# Patient Record
Sex: MALE | Race: WHITE | NOT HISPANIC OR LATINO
[De-identification: names, ages, dates, MRNs, and addresses within clinical notes are randomized per-mention and may not be internally consistent; named-entity substitution may affect disease eponyms.]

---

## 2017-01-10 ENCOUNTER — APPOINTMENT (OUTPATIENT)
Dept: CARDIOLOGY | Facility: CLINIC | Age: 72
End: 2017-01-10

## 2017-01-31 ENCOUNTER — APPOINTMENT (OUTPATIENT)
Dept: CARDIOLOGY | Facility: CLINIC | Age: 72
End: 2017-01-31

## 2017-02-13 ENCOUNTER — APPOINTMENT (OUTPATIENT)
Dept: ELECTROPHYSIOLOGY | Facility: CLINIC | Age: 72
End: 2017-02-13

## 2017-02-13 VITALS
DIASTOLIC BLOOD PRESSURE: 88 MMHG | HEART RATE: 152 BPM | WEIGHT: 210 LBS | HEIGHT: 72 IN | SYSTOLIC BLOOD PRESSURE: 120 MMHG | BODY MASS INDEX: 28.44 KG/M2

## 2017-02-13 DIAGNOSIS — Z78.9 OTHER SPECIFIED HEALTH STATUS: ICD-10-CM

## 2017-02-13 DIAGNOSIS — Z82.49 FAMILY HISTORY OF ISCHEMIC HEART DISEASE AND OTHER DISEASES OF THE CIRCULATORY SYSTEM: ICD-10-CM

## 2017-02-13 DIAGNOSIS — Z87.438 PERSONAL HISTORY OF OTHER DISEASES OF MALE GENITAL ORGANS: ICD-10-CM

## 2017-02-14 PROBLEM — Z82.49 FAMILY HISTORY OF CORONARY ARTERIOSCLEROSIS: Status: ACTIVE | Noted: 2017-02-14

## 2017-02-14 PROBLEM — Z78.9 SOCIAL ALCOHOL USE: Status: ACTIVE | Noted: 2017-02-14

## 2017-02-14 PROBLEM — Z87.438 HISTORY OF BPH: Status: ACTIVE | Noted: 2017-02-14

## 2017-02-14 RX ORDER — DUTASTERIDE 0.5 MG/1
0.5 CAPSULE, LIQUID FILLED ORAL
Refills: 0 | Status: ACTIVE | COMMUNITY

## 2017-02-14 RX ORDER — CLONAZEPAM 1 MG/1
1 TABLET ORAL
Refills: 0 | Status: ACTIVE | COMMUNITY

## 2017-02-14 RX ORDER — UBIDECARENONE 100 MG
100 CAPSULE ORAL
Refills: 0 | Status: ACTIVE | COMMUNITY

## 2017-02-14 RX ORDER — CHROMIUM 200 MCG
1000 TABLET ORAL
Refills: 0 | Status: ACTIVE | COMMUNITY

## 2017-02-15 NOTE — ASU PATIENT PROFILE, ADULT - PMH
BPH (benign prostatic hyperplasia)    GERD (gastroesophageal reflux disease)    Palpitations    SVT (supraventricular tachycardia)

## 2017-02-16 ENCOUNTER — OUTPATIENT (OUTPATIENT)
Dept: OUTPATIENT SERVICES | Facility: HOSPITAL | Age: 72
LOS: 1 days | Discharge: HOME | End: 2017-02-16
Payer: MEDICARE

## 2017-02-16 VITALS
RESPIRATION RATE: 20 BRPM | OXYGEN SATURATION: 100 % | SYSTOLIC BLOOD PRESSURE: 124 MMHG | TEMPERATURE: 98 F | HEART RATE: 62 BPM | HEIGHT: 73 IN | WEIGHT: 210.32 LBS | DIASTOLIC BLOOD PRESSURE: 92 MMHG

## 2017-02-16 DIAGNOSIS — Z90.89 ACQUIRED ABSENCE OF OTHER ORGANS: Chronic | ICD-10-CM

## 2017-02-16 DIAGNOSIS — Z98.890 OTHER SPECIFIED POSTPROCEDURAL STATES: Chronic | ICD-10-CM

## 2017-02-16 LAB
ANION GAP SERPL CALC-SCNC: 7 MMOL/L — SIGNIFICANT CHANGE UP (ref 5–17)
BUN SERPL-MCNC: 18 MG/DL — SIGNIFICANT CHANGE UP (ref 7–23)
CALCIUM SERPL-MCNC: 9.5 MG/DL — SIGNIFICANT CHANGE UP (ref 8.5–10.1)
CHLORIDE SERPL-SCNC: 106 MMOL/L — SIGNIFICANT CHANGE UP (ref 96–108)
CO2 SERPL-SCNC: 27 MMOL/L — SIGNIFICANT CHANGE UP (ref 22–31)
CREAT SERPL-MCNC: 1.18 MG/DL — SIGNIFICANT CHANGE UP (ref 0.5–1.3)
GLUCOSE SERPL-MCNC: 98 MG/DL — SIGNIFICANT CHANGE UP (ref 70–99)
HCT VFR BLD CALC: 48.5 % — SIGNIFICANT CHANGE UP (ref 39–50)
HGB BLD-MCNC: 16.7 G/DL — SIGNIFICANT CHANGE UP (ref 13–17)
MCHC RBC-ENTMCNC: 32 PG — SIGNIFICANT CHANGE UP (ref 27–34)
MCHC RBC-ENTMCNC: 34.5 GM/DL — SIGNIFICANT CHANGE UP (ref 32–36)
MCV RBC AUTO: 93 FL — SIGNIFICANT CHANGE UP (ref 80–100)
PLATELET # BLD AUTO: 225 K/UL — SIGNIFICANT CHANGE UP (ref 150–400)
POTASSIUM SERPL-MCNC: 4.6 MMOL/L — SIGNIFICANT CHANGE UP (ref 3.5–5.3)
POTASSIUM SERPL-SCNC: 4.6 MMOL/L — SIGNIFICANT CHANGE UP (ref 3.5–5.3)
RBC # BLD: 5.22 M/UL — SIGNIFICANT CHANGE UP (ref 4.2–5.8)
RBC # FLD: 11 % — SIGNIFICANT CHANGE UP (ref 10.3–14.5)
SODIUM SERPL-SCNC: 140 MMOL/L — SIGNIFICANT CHANGE UP (ref 135–145)
WBC # BLD: 7.8 K/UL — SIGNIFICANT CHANGE UP (ref 3.8–10.5)
WBC # FLD AUTO: 7.8 K/UL — SIGNIFICANT CHANGE UP (ref 3.8–10.5)

## 2017-02-16 RX ORDER — FINASTERIDE 5 MG/1
5 TABLET, FILM COATED ORAL DAILY
Qty: 0 | Refills: 0 | Status: DISCONTINUED | OUTPATIENT
Start: 2017-02-16 | End: 2017-02-17

## 2017-02-16 RX ORDER — VALSARTAN 80 MG/1
320 TABLET ORAL DAILY
Qty: 0 | Refills: 0 | Status: DISCONTINUED | OUTPATIENT
Start: 2017-02-16 | End: 2017-02-17

## 2017-02-16 RX ORDER — ISOPROTERENOL HYDROCHLORIDE 1 MG/5ML
0.07 INJECTION, SOLUTION INTRACARDIAC; INTRAMUSCULAR; INTRAVENOUS; SUBCUTANEOUS
Qty: 1 | Refills: 0 | Status: DISCONTINUED | OUTPATIENT
Start: 2017-02-16 | End: 2017-02-17

## 2017-02-16 RX ORDER — CLONAZEPAM 1 MG
1 TABLET ORAL DAILY
Qty: 0 | Refills: 0 | Status: DISCONTINUED | OUTPATIENT
Start: 2017-02-16 | End: 2017-02-17

## 2017-02-16 RX ORDER — AMLODIPINE BESYLATE 2.5 MG/1
5 TABLET ORAL DAILY
Qty: 0 | Refills: 0 | Status: DISCONTINUED | OUTPATIENT
Start: 2017-02-16 | End: 2017-02-17

## 2017-02-16 RX ORDER — CARVEDILOL PHOSPHATE 80 MG/1
3.12 CAPSULE, EXTENDED RELEASE ORAL EVERY 12 HOURS
Qty: 0 | Refills: 0 | Status: DISCONTINUED | OUTPATIENT
Start: 2017-02-16 | End: 2017-02-17

## 2017-02-16 RX ORDER — ASPIRIN/CALCIUM CARB/MAGNESIUM 324 MG
325 TABLET ORAL DAILY
Qty: 0 | Refills: 0 | Status: DISCONTINUED | OUTPATIENT
Start: 2017-02-16 | End: 2017-02-17

## 2017-02-16 RX ADMIN — FINASTERIDE 5 MILLIGRAM(S): 5 TABLET, FILM COATED ORAL at 22:28

## 2017-02-16 RX ADMIN — Medication 1 MILLIGRAM(S): at 22:28

## 2017-02-16 RX ADMIN — CARVEDILOL PHOSPHATE 3.12 MILLIGRAM(S): 80 CAPSULE, EXTENDED RELEASE ORAL at 20:16

## 2017-02-16 NOTE — CHART NOTE - NSCHARTNOTEFT_GEN_A_CORE
[place] [address]  Electrophysiology Lab    EP Study and SVT Ablation  Electrophysiologic Study with catheter Ablation    Patient Information    Patient Name			Roverto Marc  Study Date			2017  MRN				[id number]  [facility]				1945  Age				71yrs  Gender				Male  Referring			Karthikeyan Marino MD  Electrophysiologist		Donaldo Blanc MD    Procedure: Diagnostic EP Study and SVT Ablation  Indication: SVT  Anesthesia: As per anesthesiology note and 1% Lidocaine to each groin site  Methods:   The right and left groins were prepped with chorhexidine and then draped via sterile technique.  A 1% lidocaine solution was used to anesthetize each groin site.  Using the modified seldinger technique left and right femoral venous access was obtained and sheaths were placed (see below for further details).  Catheters were brought up to the heart and diagnostic EP testing was performed followed by SVT ablation (please see below for further detail).  At the completion of the procedure the catheters were removed (Figure of 8 – 0 Ethibond suture was applied to each groin site) the sheaths were removed and pressure was held and hemostasis obtained.  The patient was observed in the recovery santana and was noted to be in stable condition.        Equipment:            Sheaths  Left Femoral vein - 6 fr Sheath, 5 fr Sheath, 5 fr Sheath  Right Femoral vein - 8 fr Sheath (SR0 exchanged), 7 fr sheath  Catheters            Jsn X2, CRD-2, deflectable decapolar, BioSense 4mm Ablation Catheter  Mapping            CARTO 3 D Mapping   Findings:            Baseline Intervals               SCL –1021ms     AL –268ms     QRS -  71ms   QT- 386ms  AH-  161ms    HV- 65ms                         Ventricular Testing              VABCL- 380ms  	               -             Atrial Testing                AVNBCL- 500ms (post ablation – AVNRT always occurred pre-ablation)                              Notes                 - Dual AV nodes were demonstrated with echo beats seen with A1/A2.  AVNRT was nearly incessant and  all echo beats which were concentric.           Ablation:            A His cloud was created using the CARTO 3D mapping system.  The ablation catheter was positioned along the tricuspid annulus immediately anterior to the coronary sinus ostium.  The AV ratio recording from the distal electrode pair was about 1:3.  RF applications were delivered at about site 8 (on the 12 point scale) and numerous junctionals were elicited.  There were no dropped atrial beats noted during ablation and special care was taken to observe for them.    Post Ablation Testing:            Baseline intervals including the HV interval after the final RF application were measured and noted to be unchanged from prior to ablation.  Isuprel was used to re-assess for any inducible arrhythmias and there were none inducible.         Complications:            None      Impression:  •	     Inducible Typical AV Node Reentry.   •	     Successful slow pathway modification for treatment of this condition.     Recommendation(s):    •	Follow-up in 2-4 weeks as OP  •	Beta blocker can be discontinued  •	Observe on telemetry for 5 hours and then discharge in am after suture removal if stable             Donaldo Blanc MD, FHRS, FACC    of Cardiology [employer] [facility] [employer]  ABIM Certification in Cardiac EP / Cardiovascular Disease & Internal Medicine  [facility]

## 2017-02-16 NOTE — PACU DISCHARGE NOTE - COMMENTS
Pt placed on telemetry monitor; rhythm verified by Norah MARTINEZ; transported via stretcher with transporter

## 2017-02-17 ENCOUNTER — TRANSCRIPTION ENCOUNTER (OUTPATIENT)
Age: 72
End: 2017-02-17

## 2017-02-17 VITALS
TEMPERATURE: 98 F | RESPIRATION RATE: 17 BRPM | SYSTOLIC BLOOD PRESSURE: 111 MMHG | DIASTOLIC BLOOD PRESSURE: 61 MMHG | OXYGEN SATURATION: 92 % | HEART RATE: 62 BPM

## 2017-02-17 LAB
ANION GAP SERPL CALC-SCNC: 6 MMOL/L — SIGNIFICANT CHANGE UP (ref 5–17)
BASOPHILS # BLD AUTO: 0.1 K/UL — SIGNIFICANT CHANGE UP (ref 0–0.2)
BASOPHILS NFR BLD AUTO: 0.7 % — SIGNIFICANT CHANGE UP (ref 0–2)
BUN SERPL-MCNC: 23 MG/DL — SIGNIFICANT CHANGE UP (ref 7–23)
CALCIUM SERPL-MCNC: 9 MG/DL — SIGNIFICANT CHANGE UP (ref 8.5–10.1)
CHLORIDE SERPL-SCNC: 105 MMOL/L — SIGNIFICANT CHANGE UP (ref 96–108)
CO2 SERPL-SCNC: 30 MMOL/L — SIGNIFICANT CHANGE UP (ref 22–31)
CREAT SERPL-MCNC: 1.09 MG/DL — SIGNIFICANT CHANGE UP (ref 0.5–1.3)
EOSINOPHIL # BLD AUTO: 0.3 K/UL — SIGNIFICANT CHANGE UP (ref 0–0.5)
EOSINOPHIL NFR BLD AUTO: 4.3 % — SIGNIFICANT CHANGE UP (ref 0–6)
GLUCOSE SERPL-MCNC: 119 MG/DL — HIGH (ref 70–99)
HCT VFR BLD CALC: 44.4 % — SIGNIFICANT CHANGE UP (ref 39–50)
HGB BLD-MCNC: 15.2 G/DL — SIGNIFICANT CHANGE UP (ref 13–17)
LYMPHOCYTES # BLD AUTO: 1.5 K/UL — SIGNIFICANT CHANGE UP (ref 1–3.3)
LYMPHOCYTES # BLD AUTO: 18 % — SIGNIFICANT CHANGE UP (ref 13–44)
MCHC RBC-ENTMCNC: 32.4 PG — SIGNIFICANT CHANGE UP (ref 27–34)
MCHC RBC-ENTMCNC: 34.4 GM/DL — SIGNIFICANT CHANGE UP (ref 32–36)
MCV RBC AUTO: 94.4 FL — SIGNIFICANT CHANGE UP (ref 80–100)
MONOCYTES # BLD AUTO: 0.8 K/UL — SIGNIFICANT CHANGE UP (ref 0–0.9)
MONOCYTES NFR BLD AUTO: 9.3 % — SIGNIFICANT CHANGE UP (ref 2–14)
NEUTROPHILS # BLD AUTO: 5.5 K/UL — SIGNIFICANT CHANGE UP (ref 1.8–7.4)
NEUTROPHILS NFR BLD AUTO: 67.7 % — SIGNIFICANT CHANGE UP (ref 43–77)
PLATELET # BLD AUTO: 211 K/UL — SIGNIFICANT CHANGE UP (ref 150–400)
POTASSIUM SERPL-MCNC: 4 MMOL/L — SIGNIFICANT CHANGE UP (ref 3.5–5.3)
POTASSIUM SERPL-SCNC: 4 MMOL/L — SIGNIFICANT CHANGE UP (ref 3.5–5.3)
RBC # BLD: 4.7 M/UL — SIGNIFICANT CHANGE UP (ref 4.2–5.8)
RBC # FLD: 11.4 % — SIGNIFICANT CHANGE UP (ref 10.3–14.5)
SODIUM SERPL-SCNC: 141 MMOL/L — SIGNIFICANT CHANGE UP (ref 135–145)
WBC # BLD: 8.1 K/UL — SIGNIFICANT CHANGE UP (ref 3.8–10.5)
WBC # FLD AUTO: 8.1 K/UL — SIGNIFICANT CHANGE UP (ref 3.8–10.5)

## 2017-02-17 PROCEDURE — 93010 ELECTROCARDIOGRAM REPORT: CPT

## 2017-02-17 RX ADMIN — VALSARTAN 320 MILLIGRAM(S): 80 TABLET ORAL at 06:55

## 2017-02-17 RX ADMIN — CARVEDILOL PHOSPHATE 3.12 MILLIGRAM(S): 80 CAPSULE, EXTENDED RELEASE ORAL at 06:54

## 2017-02-17 RX ADMIN — AMLODIPINE BESYLATE 5 MILLIGRAM(S): 2.5 TABLET ORAL at 06:53

## 2017-02-17 RX ADMIN — Medication 1 MILLIGRAM(S): at 11:00

## 2017-02-17 NOTE — DISCHARGE NOTE ADULT - CARE PLAN
Principal Discharge DX:	SVT (supraventricular tachycardia)  Goal:	remains in normal sinus rhythm  Instructions for follow-up, activity and diet:	continue coreg, please  follow printed post discharge instruction paper.  Secondary Diagnosis:	HTN (hypertension)  Goal:	maintain blood pressure in normal range  Instructions for follow-up, activity and diet:	continue medications as directed, low salt diet

## 2017-02-17 NOTE — DISCHARGE NOTE ADULT - PLAN OF CARE
remains in normal sinus rhythm continue coreg, please  follow printed post discharge instruction paper. maintain blood pressure in normal range continue medications as directed, low salt diet

## 2017-02-17 NOTE — DISCHARGE NOTE ADULT - CARE PROVIDER_API CALL
Donaldo Blanc (MD), Cardiac Electrophysiology; Cardiovascular Disease; Internal Medicine  [address] [place] [address]  Phone: [phone]  Fax: [phone]

## 2017-02-17 NOTE — PROGRESS NOTE ADULT - SUBJECTIVE AND OBJECTIVE BOX
Patient is a 71y old  Male who presents with a chief complaint of recurrent palpitations and subsequent Carto SVT ablation     HPI: This is a 70 yo male with a PMH of recurrent palpitations and lightheaded spells , HTN, GERD, BPH, and hypercholesterolemia presenting for Carto SVT RF ablation      PAST MEDICAL & SURGICAL HISTORY:  GERD (gastroesophageal reflux disease)  BPH (benign prostatic hyperplasia)  Palpitations  SVT (supraventricular tachycardia)  S/P tonsillectomy  H/O hand surgery  No significant past surgical history               STRESS  FINDINGS: 2015-Normal findings          MEDICATIONS  (STANDING):  isoproterenol Infusion 0.067MICROgram(s)/Min IV Continuous <Continuous>  finasteride 5milliGRAM(s) Oral daily  aspirin 325milliGRAM(s) Oral daily  carvedilol 3.125milliGRAM(s) Oral every 12 hours  valsartan 320milliGRAM(s) Oral daily  hydrochlorothiazide    Capsule 12.5milliGRAM(s) Oral daily  clonazePAM Tablet 1milliGRAM(s) Oral daily  amLODIPine   Tablet 5milliGRAM(s) Oral daily                ROS:     A comprehensive review of systems was performed and pertinent items are noted in the history above. A detailed ROS is as follows:    Constitutional	     Negative for anorexia, appetite changes, chills, fatigue, fevers, malaise, sweats and weight gain / loss.  Eyes: 	                         Negative for icterus, irritation, redness and visual disturbance.  ENT, mouth and face:	     Negative for ear discharge, earaches, hearing loss, tinnitus,  epistaxis, nasal congestion, snoring, sleep apnea, oral sores, dental and gum infection, sore throat hoarseness or voice change.  Neck:	                         Negative for thyroid enlargement, neck pain, swollen glands and difficulty in swallowing  Respiratory:                       Negative for asthma, chronic bronchitis, cough, dyspnea on exertion, emphysema, hemoptysis, pleurisy/chest pain, pneumonia, sputum, stridor and wheezing  Cardiovascular:                  Negative for chest pain, dyspnea, fatigue, irregular heart beat, near-syncope, orthopnea, palpitations, paroxysmal nocturnal dyspnea and syncope  Gastrointestinal:	      Negative for abdominal pain, nausea, vomiting, change in bowel habits, constipation, diarrhea, dyspepsia, dysphagia, odynophagia, reflux symptoms, jaundice, hematemesis, melena and hematochezia.  Genitourinary:	      Negative for genital lesions, discharge, bleeding, sexual problems, dysuria, frequency, hematuria and urinary incontinence.  Skin / Breast: 	      Negative for breast lump, breast tenderness. Negative for skin rash, redness, pruritis, swelling dryness and fissures.  Hematologic/lymphatic:   Negative for bleeding disorder, clotting disorder, petechial rash, easy bruising and lymphadenopathy.  Musculoskeletal:	      Negative for arthralgias, back pain, bone pain, muscle weakness, myalgias, neck pain and stiff joints  Vascular:	                          No leg pain, cramps, discoloration or edema.   Neurological:	      Negative for coordination problems, dizziness, gait problems, headaches, memory problems, paresthesia, seizures, speech problems, tremors, vertigo and weakness  Behavioral/Psych: 	      Negative for mood change, depression, anxiety, suicidal attempts.  Endocrine:	                          Negative for blurry vision, increased fatigue, polydipsia, polyphagia, polyuria, poor wound healing, pruritus, skin dryness and weight loss, fertility problems and temperature intolerance.  Allergic/Immunologic:	      Negative for anaphylaxis, angioedema and urticaria.      Vital Signs Last 24 Hrs  T(C): 36.5, Max: 36.7 (02-16 @ 13:53)  T(F): 97.7, Max: 98 (02-16 @ 13:53)  HR: 60 (58 - 69)  BP: 118/64 (118/64 - 152/82)  BP(mean): --  RR: 18 (16 - 20)  SpO2: 95% (95% - 100%)          INTERPRETATION OF TELEMETRY: SR with first degree AVB-240 ms HR 58-90s      ECG:SR with first degree AVB HR 60         LABS:                          15.2   8.1   )-----------( 211      ( 17 Feb 2017 05:39 )             44.4     17 Feb 2017 05:39    141    |  105    |  23     ----------------------------<  119    4.0     |  30     |  1.09     Ca    9.0        17 Feb 2017 05:39

## 2017-02-17 NOTE — DISCHARGE NOTE ADULT - PATIENT PORTAL LINK FT
“You can access the [facility] Patient Portal, offered by [facility], by registering with the following website: [email or web address]”

## 2017-02-17 NOTE — DISCHARGE NOTE ADULT - PROCEDURE 1
Ablation of arrhythmogenic focus for supraventricular tachycardia with complete electrophysiology study

## 2017-02-17 NOTE — DISCHARGE NOTE ADULT - MEDICATION SUMMARY - MEDICATIONS TO TAKE
I will START or STAY ON the medications listed below when I get home from the hospital:    Avodart 0.5 mg oral capsule  --  by mouth   -- Indication: For BPH (benign prostatic hyperplasia)    aspirin 325 mg oral tablet  -- 1 tab(s) by mouth once a day  -- Indication: For Palpitations    clonazePAM 1 mg oral tablet  --  by mouth   -- Indication: For anxiety    Livalo 4 mg oral tablet  --  by mouth   -- Indication: For dyslipidemia    Diovan  mg-12.5 mg oral tablet  --  by mouth   -- Indication: For HTN    carvedilol 3.125 mg oral tablet  --  by mouth   -- Indication: For SUPRAVENTRICULAR TACCHYCARDIA    Norvasc 5 mg oral tablet  --  by mouth   -- Indication: For HTN    Zantac  --  by mouth   -- Indication: For Prophylaxis    Co Q-10 100 mg oral capsule  --  by mouth   -- Indication: For SUPlements    Vitamin D3 1000 intl units oral tablet  --  by mouth   -- Indication: For SUPlements

## 2017-02-17 NOTE — DISCHARGE NOTE ADULT - HOSPITAL COURSE
This is a 72 yo male with a PMH of recurrent palpitations and lightheaded spells , HTN, GERD, BPH, and hypercholesterolemia s/p EPS & Carto SVT RF ablation on 2/16/17.  pt remains in SR without further episode of SVT on telemetry, stable for d/c home today., to f/u with Dr. lake in 7-10days. This is a 72 yo male with a PMH of recurrent palpitations and lightheaded spells , HTN, GERD, BPH, and hypercholesterolemia s/p EPS & Carto SVT RF ablation (AVNRT) on 2/16/17.  pt remains in SR without further episode of SVT on telemetry, stable for d/c home today., to f/u with Dr. lake in 7-10days.

## 2017-02-21 ENCOUNTER — APPOINTMENT (OUTPATIENT)
Dept: CARDIOLOGY | Facility: CLINIC | Age: 72
End: 2017-02-21

## 2017-02-25 DIAGNOSIS — E78.00 PURE HYPERCHOLESTEROLEMIA, UNSPECIFIED: ICD-10-CM

## 2017-02-25 DIAGNOSIS — I10 ESSENTIAL (PRIMARY) HYPERTENSION: ICD-10-CM

## 2017-02-25 DIAGNOSIS — N40.0 BENIGN PROSTATIC HYPERPLASIA WITHOUT LOWER URINARY TRACT SYMPTOMS: ICD-10-CM

## 2017-02-25 DIAGNOSIS — I47.1 SUPRAVENTRICULAR TACHYCARDIA: ICD-10-CM

## 2017-02-25 DIAGNOSIS — E78.5 HYPERLIPIDEMIA, UNSPECIFIED: ICD-10-CM

## 2017-02-25 DIAGNOSIS — K21.9 GASTRO-ESOPHAGEAL REFLUX DISEASE WITHOUT ESOPHAGITIS: ICD-10-CM

## 2017-02-25 DIAGNOSIS — Z82.49 FAMILY HISTORY OF ISCHEMIC HEART DISEASE AND OTHER DISEASES OF THE CIRCULATORY SYSTEM: ICD-10-CM

## 2017-03-10 ENCOUNTER — APPOINTMENT (OUTPATIENT)
Dept: ELECTROPHYSIOLOGY | Facility: CLINIC | Age: 72
End: 2017-03-10

## 2017-03-10 VITALS — HEART RATE: 56 BPM | SYSTOLIC BLOOD PRESSURE: 117 MMHG | DIASTOLIC BLOOD PRESSURE: 79 MMHG | OXYGEN SATURATION: 96 %

## 2017-03-13 ENCOUNTER — APPOINTMENT (OUTPATIENT)
Dept: CARDIOLOGY | Facility: CLINIC | Age: 72
End: 2017-03-13

## 2017-04-10 NOTE — ASU PREOP CHECKLIST - BLOOD AVAILABLE
Onset several months ago with a sinus infection been on a couple antibiotics, states woke up this morning with \"a super high fever\" but did not take temp.  Cough, and chest soreness, ear hurts. Last ibuprofen [id number] this morning.   n/a

## 2017-04-26 ENCOUNTER — APPOINTMENT (OUTPATIENT)
Dept: CARDIOLOGY | Facility: CLINIC | Age: 72
End: 2017-04-26

## 2017-08-24 ENCOUNTER — APPOINTMENT (OUTPATIENT)
Dept: CARDIOLOGY | Facility: CLINIC | Age: 72
End: 2017-08-24
Payer: MEDICARE

## 2017-08-24 PROCEDURE — 99214 OFFICE O/P EST MOD 30 MIN: CPT

## 2017-09-18 ENCOUNTER — APPOINTMENT (OUTPATIENT)
Dept: CARDIOLOGY | Facility: CLINIC | Age: 72
End: 2017-09-18

## 2017-11-10 ENCOUNTER — RECORD ABSTRACTING (OUTPATIENT)
Age: 72
End: 2017-11-10

## 2017-11-28 ENCOUNTER — RX RENEWAL (OUTPATIENT)
Age: 72
End: 2017-11-28

## 2017-12-20 ENCOUNTER — APPOINTMENT (OUTPATIENT)
Dept: CARDIOLOGY | Facility: CLINIC | Age: 72
End: 2017-12-20
Payer: MEDICARE

## 2017-12-20 VITALS
BODY MASS INDEX: 28.58 KG/M2 | HEIGHT: 72 IN | HEART RATE: 58 BPM | DIASTOLIC BLOOD PRESSURE: 64 MMHG | SYSTOLIC BLOOD PRESSURE: 122 MMHG | OXYGEN SATURATION: 99 % | WEIGHT: 211 LBS

## 2017-12-20 PROCEDURE — 99214 OFFICE O/P EST MOD 30 MIN: CPT

## 2017-12-20 PROCEDURE — 93306 TTE W/DOPPLER COMPLETE: CPT

## 2017-12-20 PROCEDURE — 93880 EXTRACRANIAL BILAT STUDY: CPT

## 2018-06-13 ENCOUNTER — RX RENEWAL (OUTPATIENT)
Age: 73
End: 2018-06-13

## 2018-06-20 ENCOUNTER — APPOINTMENT (OUTPATIENT)
Dept: CARDIOLOGY | Facility: CLINIC | Age: 73
End: 2018-06-20
Payer: MEDICARE

## 2018-06-20 VITALS
BODY MASS INDEX: 28.71 KG/M2 | WEIGHT: 212 LBS | HEIGHT: 72 IN | HEART RATE: 54 BPM | SYSTOLIC BLOOD PRESSURE: 118 MMHG | DIASTOLIC BLOOD PRESSURE: 64 MMHG

## 2018-06-20 PROCEDURE — 99214 OFFICE O/P EST MOD 30 MIN: CPT

## 2018-06-20 RX ORDER — VALSARTAN AND HYDROCHLOROTHIAZIDE 320; 12.5 MG/1; MG/1
320-12.5 TABLET, FILM COATED ORAL
Refills: 0 | Status: DISCONTINUED | COMMUNITY
End: 2018-06-20

## 2018-06-20 RX ORDER — CARVEDILOL 3.12 MG/1
3.12 TABLET, FILM COATED ORAL
Refills: 0 | Status: DISCONTINUED | COMMUNITY
End: 2018-06-20

## 2018-06-20 RX ORDER — ASPIRIN 325 MG/1
325 TABLET, FILM COATED ORAL
Refills: 0 | Status: DISCONTINUED | COMMUNITY
End: 2018-06-20

## 2018-07-24 PROBLEM — K21.9 GASTRO-ESOPHAGEAL REFLUX DISEASE WITHOUT ESOPHAGITIS: Chronic | Status: ACTIVE | Noted: 2017-02-16

## 2018-07-24 PROBLEM — N40.0 BENIGN PROSTATIC HYPERPLASIA WITHOUT LOWER URINARY TRACT SYMPTOMS: Chronic | Status: ACTIVE | Noted: 2017-02-16

## 2018-07-24 PROBLEM — I47.1 SUPRAVENTRICULAR TACHYCARDIA: Chronic | Status: ACTIVE | Noted: 2017-02-16

## 2018-07-24 PROBLEM — R00.2 PALPITATIONS: Chronic | Status: ACTIVE | Noted: 2017-02-16

## 2018-07-26 ENCOUNTER — APPOINTMENT (OUTPATIENT)
Dept: CARDIOLOGY | Facility: CLINIC | Age: 73
End: 2018-07-26
Payer: MEDICARE

## 2018-07-26 ENCOUNTER — NON-APPOINTMENT (OUTPATIENT)
Age: 73
End: 2018-07-26

## 2018-07-26 VITALS
OXYGEN SATURATION: 98 % | WEIGHT: 208 LBS | DIASTOLIC BLOOD PRESSURE: 70 MMHG | HEART RATE: 59 BPM | HEIGHT: 73 IN | SYSTOLIC BLOOD PRESSURE: 120 MMHG | BODY MASS INDEX: 27.57 KG/M2

## 2018-07-26 PROCEDURE — 99214 OFFICE O/P EST MOD 30 MIN: CPT

## 2018-07-26 PROCEDURE — 93000 ELECTROCARDIOGRAM COMPLETE: CPT

## 2018-07-27 ENCOUNTER — MEDICATION RENEWAL (OUTPATIENT)
Age: 73
End: 2018-07-27

## 2018-07-27 RX ORDER — VALSARTAN AND HYDROCHLOROTHIAZIDE 320; 12.5 MG/1; MG/1
320-12.5 TABLET, FILM COATED ORAL
Qty: 90 | Refills: 3 | Status: DISCONTINUED | COMMUNITY
Start: 2017-11-28 | End: 2018-07-27

## 2018-07-30 PROCEDURE — 93224 XTRNL ECG REC UP TO 48 HRS: CPT

## 2018-08-09 ENCOUNTER — APPOINTMENT (OUTPATIENT)
Dept: CARDIOLOGY | Facility: CLINIC | Age: 73
End: 2018-08-09
Payer: MEDICARE

## 2018-08-09 VITALS
SYSTOLIC BLOOD PRESSURE: 110 MMHG | WEIGHT: 210 LBS | HEIGHT: 73 IN | DIASTOLIC BLOOD PRESSURE: 72 MMHG | HEART RATE: 58 BPM | BODY MASS INDEX: 27.83 KG/M2

## 2018-08-09 PROCEDURE — 99213 OFFICE O/P EST LOW 20 MIN: CPT

## 2018-12-19 ENCOUNTER — APPOINTMENT (OUTPATIENT)
Dept: CARDIOLOGY | Facility: CLINIC | Age: 73
End: 2018-12-19
Payer: MEDICARE

## 2018-12-19 VITALS
BODY MASS INDEX: 26.9 KG/M2 | OXYGEN SATURATION: 97 % | DIASTOLIC BLOOD PRESSURE: 72 MMHG | HEIGHT: 73 IN | WEIGHT: 203 LBS | SYSTOLIC BLOOD PRESSURE: 120 MMHG | HEART RATE: 47 BPM

## 2018-12-19 PROCEDURE — 93306 TTE W/DOPPLER COMPLETE: CPT

## 2018-12-19 PROCEDURE — 99214 OFFICE O/P EST MOD 30 MIN: CPT

## 2018-12-19 RX ORDER — AMLODIPINE BESYLATE 5 MG/1
5 TABLET ORAL
Refills: 0 | Status: DISCONTINUED | COMMUNITY
End: 2018-12-19

## 2018-12-19 NOTE — HISTORY OF PRESENT ILLNESS
[FreeTextEntry1] : \par Other active medical problems as noted\par \par *Essential hypertension. Hypertensive heart disease. On carvedilol, Amlodipine, and Valsartan-Hydrochlorothiazide.\par \par •dyslipidemia mixed : Tolerating livalo. \par \par •Coronary artery disease, nonobstructive on a CTA, per past note. Preserved LV systolic function.  Stable perfusion scan, per past note.  On antiplatelet agent, statin therapy and beta blockers.\par \par • thoracic aortic ectasia. Stable, remaining asymptomatic. \par \par •moderate mitral/aortic non rheumatic  regurgitation. Stable LV dimension, ejection fraction, no history or signs of left or right heart failure per past note. 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  \par

## 2018-12-19 NOTE — REVIEW OF SYSTEMS
[Shortness Of Breath] : no shortness of breath [Dyspnea on exertion] : dyspnea during exertion [Chest  Pressure] : no chest pressure [Chest Pain] : no chest pain [Lower Ext Edema] : no extremity edema [Leg Claudication] : no intermittent leg claudication [Palpitations] : palpitations [Negative] : Heme/Lymph

## 2018-12-19 NOTE — REASON FOR VISIT
[Follow-Up - Clinic] : a clinic follow-up of [Palpitations] : palpitations [Supraventricular Tachycardia] : supraventricular tachycardia [FreeTextEntry1] : 73-year-old comes in for a followup consultation f/u labs, echocardiogram\par no further palpitations.\par No complaint of chest pain, shortness of breath, PND, orthopnea.\par Continued to be very active.\par Stable exertional dyspnea. Without PND, orthopnea, or pedal edema.\par No recent hospital admission.\par No changes in medication.\par No claudication pain.\par Stable. Diet, activity, and weight.\par Stable. Sleep pattern.\par

## 2018-12-19 NOTE — PHYSICAL EXAM
[General Appearance - Well Developed] : well developed [Normal Appearance] : normal appearance [Well Groomed] : well groomed [General Appearance - Well Nourished] : well nourished [No Deformities] : no deformities [General Appearance - In No Acute Distress] : no acute distress [Normal Conjunctiva] : the conjunctiva exhibited no abnormalities [Eyelids - No Xanthelasma] : the eyelids demonstrated no xanthelasmas [No Oral Pallor] : no oral pallor [] : no respiratory distress [Respiration, Rhythm And Depth] : normal respiratory rhythm and effort [Exaggerated Use Of Accessory Muscles For Inspiration] : no accessory muscle use [Auscultation Breath Sounds / Voice Sounds] : lungs were clear to auscultation bilaterally [Heart Rate And Rhythm] : heart rate and rhythm were normal [Heart Sounds] : normal S1 and S2 [Arterial Pulses Normal] : the arterial pulses were normal [Edema] : no peripheral edema present [Veins - Varicosity Changes] : no varicosital changes were noted in the lower extremities [FreeTextEntry1] : 1-2/6 lsb sm, no gallop/rub/heave or click [Abnormal Walk] : normal gait [Gait - Sufficient For Exercise Testing] : the gait was sufficient for exercise testing [Nail Clubbing] : no clubbing of the fingernails [Cyanosis, Localized] : no localized cyanosis [Skin Color & Pigmentation] : normal skin color and pigmentation [Oriented To Time, Place, And Person] : oriented to person, place, and time [Affect] : the affect was normal [Mood] : the mood was normal [No Anxiety] : not feeling anxious

## 2018-12-19 NOTE — ASSESSMENT
[FreeTextEntry1] : past tests for reference:\par \par CTA of the coronary arteries in 2009 was overall nonobstructive \par \par •Carotid Doppler study 7-30-14 showed mild atherosclerosis, nonobstructive. No significant change when compared to before.\par  Stress myocardial perfusion scan. 10/6/2015. 7 minutes and 26 seconds of Eber protocol 10.1 METs of workload.Nonischemic myocardial perfusion scan in a limited study.\par labs 7-29-16 Na+ 144, K 4.2, bun/creat 19/1.09, AST 21, ALT 27, LDL 83, HDL 44, triglycerides 105, hgba1c 5.8, wbc 3.8, h/h 14.2/41.5, plat 161\par  ekg 11-22-16 NSR with 1av and nsstt. \par •echo 11-14-16 ef 60% with moderate MR, calcified trileaflet AV with normal opening, mild to moderate AR, aortic rot 3.3cm, moderate LAE, normal ventricular function, midl diastolic dysfunctioin, minimal TR, normal pulmonary pressures. \par Carotid Doppler study December 20, 2017. Nose significant obstructive disease.\par Echocardiogram dated December 28 2017. LV ejection fraction 60% mild mitral and aortic regurgitation.\par Labs December 2, 2017. He is stable. CBC. Sodium 144, potassium 4.6, creatinine 1.08. LFTs normal. Total cholesterol 136.  LDL 70.\par \par Reviewed on July 26, 2018.\par EKG ordered and inspected by me. July 26, 2018. Indication palpitations, being dictation. Normal sinus rhythm. First-degree AV block. Normal intervals.\par \par Reviewed on December 19, 2018.\par Echocardiogram December 19, 2018. LV ejection fraction 55% with mitral prolapse. Mild to moderate mitral regurgitation. Ascending aorta, 4 cm. Borderline right ventricular dimensions. Mild left and right atrial enlargement. Normal pulmonary artery systolic pressure.\par Labs from November 10, 2018 were reviewed. HDL 43, LDL 73, triglycerides 90. LFT was normal. CBC and BMP were stable.

## 2018-12-19 NOTE — DISCUSSION/SUMMARY
[FreeTextEntry1] : #1 Labs and echocardiogram reviewed. \par #2 Mitral valve prolapse, mild to moderate mitral regurgitation.mild left atrial enlargement. Normal pulmonary pressure. Borderline RV dimensions. We will continue to follow on a yearly basis. This will also help to follow on thoracic aortic ectasia.\par #3 coronary atherosclerosis nonobstructive. asymptomatic. Continue lifestyle and risk factor modifications were discussed. The prevention of future cardiovascular events and associated morbidity and mortality.\par #4 essential hypertension. Hypertensive heart disease. No signs of congestive heart failure. No renal deficiency. Continue present medications.\par #5 hyperlipidemia  tolerating medications well. Continue lifestyle modification and follow  labs on a regular basis.\par #6 paroxysmal supraventricular tachycardia. Status post ablation of AVNRT. No symptomatic recurrence. Continue present regimen of medication.\par #7 thoracic aortic ectasia. Stable. No significant worsening. Continue blood pressure, heart rate control. \par He understands high risk symptoms to call 911. He works as volunteer ambulance staff. Avoid aggressive isometric exercises\par \par \par Counseling regarding low saturated fat, low carbohydrate intake was reviewed. Active lifestyle and regular. Exercise is along with weight maintenance is advised.\par All the above were at length reviewed. Answered all the questions. Thank you very much for this kind referral. Please do not hesitate to give me a call for any question.\par Part of this transcription was done with voice recognition software and phonetically similar errors are common. I apologize for that. Please donot hesitate to call for any questions due to above.\par \par OV 6 months

## 2019-07-24 ENCOUNTER — APPOINTMENT (OUTPATIENT)
Dept: CARDIOLOGY | Facility: CLINIC | Age: 74
End: 2019-07-24
Payer: MEDICARE

## 2019-07-24 ENCOUNTER — NON-APPOINTMENT (OUTPATIENT)
Age: 74
End: 2019-07-24

## 2019-07-24 VITALS
DIASTOLIC BLOOD PRESSURE: 72 MMHG | HEIGHT: 73 IN | SYSTOLIC BLOOD PRESSURE: 124 MMHG | OXYGEN SATURATION: 97 % | HEART RATE: 66 BPM | BODY MASS INDEX: 27.3 KG/M2 | WEIGHT: 206 LBS

## 2019-07-24 PROCEDURE — 93000 ELECTROCARDIOGRAM COMPLETE: CPT

## 2019-07-24 PROCEDURE — 99214 OFFICE O/P EST MOD 30 MIN: CPT

## 2019-07-24 NOTE — DISCUSSION/SUMMARY
[FreeTextEntry1] : #1 EKG was reviewed. Recent allergy related to take born disease to be followed by primary care physician.\par #2 Mitral valve prolapse, mild to moderate mitral regurgitation.mild left atrial enlargement. Normal pulmonary pressure. Borderline RV dimensions. Echocardiogram will be followed if there is any worsening he would have further evaluation and management. This will also help to follow on thoracic aortic ectasia.\par #3 coronary atherosclerosis nonobstructive. asymptomatic. Continue lifestyle and risk factor modifications were discussed. The prevention of future cardiovascular events and associated morbidity and mortality.\par #4 essential hypertension. Hypertensive heart disease. No signs of congestive heart failure. No renal deficiency. Continue present medications.\par #5 hyperlipidemia  tolerating medications well. Continue lifestyle modification and follow  labs on a regular basis.\par #6 paroxysmal supraventricular tachycardia. Status post ablation of AVNRT. No symptomatic recurrence. Continue present regimen of medication.\par #7 thoracic aortic ectasia. Stable. No significant worsening. Continue blood pressure, heart rate control. \par He understands high risk symptoms to call 911. 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 Active lifestyle and regular. Exercise is along with weight maintenance is advised.\par All the above were at length reviewed. Answered all the questions. Thank you very much for this kind referral. Please do not hesitate to give me a call for any question.\par Part of this transcription was done with voice recognition software and phonetically similar errors are common. I apologize for that. Please donot hesitate to call for any questions due to above.\par \par OV 6 months

## 2019-07-24 NOTE — REVIEW OF SYSTEMS
[Negative] : Heme/Lymph [Shortness Of Breath] : no shortness of breath [Dyspnea on exertion] : dyspnea during exertion [Chest  Pressure] : no chest pressure [Chest Pain] : no chest pain [Leg Claudication] : no intermittent leg claudication [Lower Ext Edema] : no extremity edema [Palpitations] : no palpitations

## 2019-07-24 NOTE — PHYSICAL EXAM
[General Appearance - Well Developed] : well developed [Normal Appearance] : normal appearance [Well Groomed] : well groomed [General Appearance - Well Nourished] : well nourished [No Deformities] : no deformities [General Appearance - In No Acute Distress] : no acute distress [Normal Conjunctiva] : the conjunctiva exhibited no abnormalities [Eyelids - No Xanthelasma] : the eyelids demonstrated no xanthelasmas [No Oral Pallor] : no oral pallor [] : no respiratory distress [Respiration, Rhythm And Depth] : normal respiratory rhythm and effort [Exaggerated Use Of Accessory Muscles For Inspiration] : no accessory muscle use [Auscultation Breath Sounds / Voice Sounds] : lungs were clear to auscultation bilaterally [Heart Rate And Rhythm] : heart rate and rhythm were normal [Heart Sounds] : normal S1 and S2 [Arterial Pulses Normal] : the arterial pulses were normal [Veins - Varicosity Changes] : no varicosital changes were noted in the lower extremities [Edema] : no peripheral edema present [Abnormal Walk] : normal gait [Gait - Sufficient For Exercise Testing] : the gait was sufficient for exercise testing [Cyanosis, Localized] : no localized cyanosis [Nail Clubbing] : no clubbing of the fingernails [Skin Color & Pigmentation] : normal skin color and pigmentation [Oriented To Time, Place, And Person] : oriented to person, place, and time [Affect] : the affect was normal [Mood] : the mood was normal [No Anxiety] : not feeling anxious [FreeTextEntry1] : 1-2/6 lsb sm, no gallop/rub/heave or click

## 2019-07-24 NOTE — ASSESSMENT
[FreeTextEntry1] : past tests for reference:\par \par CTA of the coronary arteries in 2009 was overall nonobstructive \par \par •Carotid Doppler study 7-30-14 showed mild atherosclerosis, nonobstructive. No significant change when compared to before.\par  Stress myocardial perfusion scan. 10/6/2015. 7 minutes and 26 seconds of Eber protocol 10.1 METs of workload.Nonischemic myocardial perfusion scan in a limited study.\par labs 7-29-16 Na+ 144, K 4.2, bun/creat 19/1.09, AST 21, ALT 27, LDL 83, HDL 44, triglycerides 105, hgba1c 5.8, wbc 3.8, h/h 14.2/41.5, plat 161\par  ekg 11-22-16 NSR with 1av and nsstt. \par •echo 11-14-16 ef 60% with moderate MR, calcified trileaflet AV with normal opening, mild to moderate AR, aortic rot 3.3cm, moderate LAE, normal ventricular function, midl diastolic dysfunctioin, minimal TR, normal pulmonary pressures. \par Carotid Doppler study December 20, 2017. Nose significant obstructive disease.\par Echocardiogram dated December 28 2017. LV ejection fraction 60% mild mitral and aortic regurgitation.\par Labs December 2, 2017. He is stable. CBC. Sodium 144, potassium 4.6, creatinine 1.08. LFTs normal. Total cholesterol 136.  LDL 70.\par \par Reviewed on July 26, 2018.\par EKG ordered and inspected by me. July 26, 2018. Indication palpitations, being dictation. Normal sinus rhythm. First-degree AV block. Normal intervals.\par \par Reviewed on December 19, 2018.\par Echocardiogram December 19, 2018. LV ejection fraction 55% with mitral prolapse. Mild to moderate mitral regurgitation. Ascending aorta, 4 cm. Borderline right ventricular dimensions. Mild left and right atrial enlargement. Normal pulmonary artery systolic pressure.\par Labs from November 10, 2018 were reviewed. HDL 43, LDL 73, triglycerides 90. LFT was normal. CBC and BMP were stable.\par \par Reviewed on July 24, 2019\par EKG. Sinus bradycardia , poor R-wave progression.\par

## 2019-07-24 NOTE — REASON FOR VISIT
[Follow-Up - Clinic] : a clinic follow-up of [Supraventricular Tachycardia] : supraventricular tachycardia [Hyperlipidemia] : hyperlipidemia [FreeTextEntry1] : 73-year-old comes in for a followup consultation\par no further palpitations.\par No complaint of chest pain, shortness of breath, PND, orthopnea.\par Continued to be very active.\par Stable exertional dyspnea. Without PND, orthopnea, or pedal edema.\par No changes in medication.\par No claudication pain.\par Stable. Diet, activity, and weight.\par Stable. Sleep pattern.\par Diagnosis of Alfagal allergies

## 2019-07-24 NOTE — HISTORY OF PRESENT ILLNESS
[FreeTextEntry1] : Other active medical problems as noted\par \par *Essential hypertension. Hypertensive heart disease. On carvedilol, Amlodipine, and Valsartan-Hydrochlorothiazide.\par \par •dyslipidemia mixed : Tolerating livalo. \par \par •Coronary artery disease, nonobstructive on a CTA, per past note. Preserved LV systolic function.  Stable perfusion scan, per past note.  On antiplatelet agent, statin therapy and beta blockers.\par \par • thoracic aortic ectasia. Stable, remaining asymptomatic. \par \par •moderate mitral/aortic non rheumatic  regurgitation. Stable LV dimension, ejection fraction, no history or signs of left or right heart failure per past note. 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  \par

## 2019-11-06 ENCOUNTER — MOBILE ON CALL (OUTPATIENT)
Age: 74
End: 2019-11-06

## 2019-12-31 ENCOUNTER — APPOINTMENT (OUTPATIENT)
Dept: CARDIOLOGY | Facility: CLINIC | Age: 74
End: 2019-12-31
Payer: MEDICARE

## 2019-12-31 VITALS
HEART RATE: 62 BPM | WEIGHT: 211 LBS | OXYGEN SATURATION: 95 % | SYSTOLIC BLOOD PRESSURE: 118 MMHG | BODY MASS INDEX: 27.96 KG/M2 | DIASTOLIC BLOOD PRESSURE: 68 MMHG | HEIGHT: 73 IN

## 2019-12-31 PROCEDURE — 99214 OFFICE O/P EST MOD 30 MIN: CPT

## 2019-12-31 PROCEDURE — 93306 TTE W/DOPPLER COMPLETE: CPT

## 2019-12-31 NOTE — PHYSICAL EXAM
[Normal Appearance] : normal appearance [General Appearance - Well Developed] : well developed [Well Groomed] : well groomed [General Appearance - In No Acute Distress] : no acute distress [No Deformities] : no deformities [General Appearance - Well Nourished] : well nourished [Eyelids - No Xanthelasma] : the eyelids demonstrated no xanthelasmas [Normal Conjunctiva] : the conjunctiva exhibited no abnormalities [No Oral Pallor] : no oral pallor [Respiration, Rhythm And Depth] : normal respiratory rhythm and effort [] : no respiratory distress [Exaggerated Use Of Accessory Muscles For Inspiration] : no accessory muscle use [Auscultation Breath Sounds / Voice Sounds] : lungs were clear to auscultation bilaterally [Heart Sounds] : normal S1 and S2 [Heart Rate And Rhythm] : heart rate and rhythm were normal [Arterial Pulses Normal] : the arterial pulses were normal [Edema] : no peripheral edema present [Veins - Varicosity Changes] : no varicosital changes were noted in the lower extremities [Abdomen Soft] : soft [FreeTextEntry1] : 1-2/6 lsb sm, no gallop/rub/heave or click [Abnormal Walk] : normal gait [Gait - Sufficient For Exercise Testing] : the gait was sufficient for exercise testing [Nail Clubbing] : no clubbing of the fingernails [Cyanosis, Localized] : no localized cyanosis [Skin Color & Pigmentation] : normal skin color and pigmentation [Affect] : the affect was normal [Oriented To Time, Place, And Person] : oriented to person, place, and time [No Anxiety] : not feeling anxious [Mood] : the mood was normal

## 2019-12-31 NOTE — DISCUSSION/SUMMARY
[FreeTextEntry1] : #1 Echocardiogram and labs were reviewed. Stable. Findings, discussed\par #2 Mitral valve prolapse, mild to moderate mitral regurgitation. Normal pulmonary pressure. Borderline RV dimensions. Stable thoracic aortic dimensions.\par #3 coronary atherosclerosis nonobstructive. asymptomatic. Continue lifestyle and risk factor modifications were discussed. The prevention of future cardiovascular events and associated morbidity and mortality.\par #4 essential hypertension. Hypertensive heart disease. No signs of congestive heart failure. No renal deficiency. Continue present medications.\par #5 hyperlipidemia  tolerating medications well. Continue lifestyle modification and follow  labs on a regular basis.\par #6 paroxysmal supraventricular tachycardia. Status post ablation of AVNRT. No symptomatic recurrence. Continue present regimen of medication.\par #7 thoracic aortic ectasia. Stable. No significant worsening. Continue blood pressure, heart rate control. \par He understands high risk symptoms to call 911. 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 Active lifestyle and regular. Exercise is along with weight maintenance is advised.\par All the above were at length reviewed. Answered all the questions. Thank you very much for this kind referral. Please do not hesitate to give me a call for any question.\par Part of this transcription was done with voice recognition software and phonetically similar errors are common. I apologize for that. Please donot hesitate to call for any questions due to above.\par \par OV 6 months

## 2019-12-31 NOTE — REVIEW OF SYSTEMS
[Shortness Of Breath] : no shortness of breath [Dyspnea on exertion] : dyspnea during exertion [Chest  Pressure] : no chest pressure [Lower Ext Edema] : no extremity edema [Chest Pain] : no chest pain [Leg Claudication] : no intermittent leg claudication [Palpitations] : no palpitations [Negative] : Heme/Lymph

## 2019-12-31 NOTE — ASSESSMENT
[FreeTextEntry1] : past tests for reference:\par \par CTA of the coronary arteries in 2009 was overall nonobstructive \par \par •Carotid Doppler study 7-30-14 showed mild atherosclerosis, nonobstructive. No significant change when compared to before.\par  Stress myocardial perfusion scan. 10/6/2015. 7 minutes and 26 seconds of Eber protocol 10.1 METs of workload.Nonischemic myocardial perfusion scan in a limited study.\par labs 7-29-16 Na+ 144, K 4.2, bun/creat 19/1.09, AST 21, ALT 27, LDL 83, HDL 44, triglycerides 105, hgba1c 5.8, wbc 3.8, h/h 14.2/41.5, plat 161\par  ekg 11-22-16 NSR with 1av and nsstt. \par •echo 11-14-16 ef 60% with moderate MR, calcified trileaflet AV with normal opening, mild to moderate AR, aortic rot 3.3cm, moderate LAE, normal ventricular function, midl diastolic dysfunctioin, minimal TR, normal pulmonary pressures. \par Carotid Doppler study December 20, 2017. Nose significant obstructive disease.\par Echocardiogram dated December 28 2017. LV ejection fraction 60% mild mitral and aortic regurgitation.\par Labs December 2, 2017. He is stable. CBC. Sodium 144, potassium 4.6, creatinine 1.08. LFTs normal. Total cholesterol 136.  LDL 70.\par \par Reviewed on July 26, 2018.\par EKG ordered and inspected by me. July 26, 2018. Indication palpitations, being dictation. Normal sinus rhythm. First-degree AV block. Normal intervals.\par \par Reviewed on December 19, 2018.\par Echocardiogram December 19, 2018. LV ejection fraction 55% with mitral prolapse. Mild to moderate mitral regurgitation. Ascending aorta, 4 cm. Borderline right ventricular dimensions. Mild left and right atrial enlargement. Normal pulmonary artery systolic pressure.\par Labs from November 10, 2018 were reviewed. HDL 43, LDL 73, triglycerides 90. LFT was normal. CBC and BMP were stable.\par \par Reviewed on July 24, 2019\par EKG. Sinus bradycardia , poor R-wave progression.\par \par Reviewed on December 31, 2019\par Labs December 26, 2019 was reviewed, which showed stable CBC, CMP, TSH, lipid panel.\par Echocardiogram December 31, 2019 EF 60% mitral prolapse mild-to-moderate mitral regurgitation, mild aortic regurgitation, normal left atrial size pulmonary pressures. 19. Overall, no significant new changes. P.\par

## 2019-12-31 NOTE — REASON FOR VISIT
[Follow-Up - Clinic] : a clinic follow-up of [Hyperlipidemia] : hyperlipidemia [Supraventricular Tachycardia] : supraventricular tachycardia [FreeTextEntry1] : 74-year-old comes in for a followup consultation. He is here to review his labs and echocardiogram, which was done today.\par no further palpitations.\par No complaint of chest pain, shortness of breath, PND, orthopnea.\par Continued to be very active.\par Stable exertional dyspnea. \par No changes in medication.\par No claudication pain.\par Stable. Diet, activity, and weight.\par Stable. Sleep pattern.\par Diagnosis of Alfagal allergies

## 2020-06-19 NOTE — DISCHARGE NOTE ADULT - IF YOU ARE A SMOKER, IT IS IMPORTANT FOR YOUR HEALTH TO STOP SMOKING. PLEASE BE AWARE THAT SECOND HAND SMOKE IS ALSO HARMFUL.
Person Calling/Reporting Symptoms: patient  Onset: March    Symptom Response   Fever > 100.4   No   Most recent   temperature: n/a   Chills?  Yes   Sore Throat?  No   Headache?  No   N/V?  No   Diarrhea?  No   Abdominal pain?  Yes   Loss of Taste/Smell?  Yes   Fatigue?  Yes   Weakness/Muscle/Joint Pain?  No   Red eye?  No   Runny nose?  No   Bruising/bleeding?  No   Rash?  No   Cough?   Yes   If cough is present, is it dry?   No   Coughing up Blood?  No   Dyspnea?  Yes   Evidence of Respiratory Distress?  No     Other Symptoms: n/a  Negative Symptoms: n/a      COVID Universal Screening   Has patient had contact with a lab confirmed COVID-19 patient within the last 14 days? No  Are any family members in the same household experiencing any of the symptoms? No  If yes, what? n/a      Pertainent History/Medications: Patient began to have symptoms in mid March. In the last two weeks that patient has had sob which sometimes acts up worse than her normal due to her COPD. Patient is using inhaler which helps with her breathing. Patient has abdominal discomfort on/off. Last time she had this was last night. She states its not a pain but uncomfortable. Patient recently lost her sense of taste but is able to smell normal. Patient at night does get the chills but does not think she has a fever. Patient feeling more fatigued than usual. She has a cough that produces white sputum. It is her normal cough due to being a current smoker.       Recommendations: Patient is having minimal symptoms. COVID universal screening is negative. Patient advised to have COVID-19 testing performed.  Patient scheduled at [facility] on Monday 6/22/2020. Advised if sob, fatigue, or cough worsen to go to  or if new symptoms develop. Patient verbalized understanding and had no questions. Lab ordered entered. [place] COVID-19 Patient Information Form faxed to [facility].     If patient develops symptoms or worsening  symptoms, patient should return call to Marty Goldstein MD.     Preferred Pharmacy Selected No      MD Advised: No  Triage Protocol Used: Yes, COVID-19   Statement Selected

## 2020-06-30 ENCOUNTER — APPOINTMENT (OUTPATIENT)
Dept: CARDIOLOGY | Facility: CLINIC | Age: 75
End: 2020-06-30
Payer: MEDICARE

## 2020-06-30 ENCOUNTER — NON-APPOINTMENT (OUTPATIENT)
Age: 75
End: 2020-06-30

## 2020-06-30 VITALS
WEIGHT: 208 LBS | DIASTOLIC BLOOD PRESSURE: 78 MMHG | SYSTOLIC BLOOD PRESSURE: 124 MMHG | OXYGEN SATURATION: 95 % | HEIGHT: 73 IN | HEART RATE: 54 BPM | BODY MASS INDEX: 27.57 KG/M2 | TEMPERATURE: 98.1 F

## 2020-06-30 PROCEDURE — 0296T: CPT

## 2020-06-30 PROCEDURE — 93000 ELECTROCARDIOGRAM COMPLETE: CPT

## 2020-06-30 PROCEDURE — 99214 OFFICE O/P EST MOD 30 MIN: CPT

## 2020-06-30 NOTE — DISCUSSION/SUMMARY
[FreeTextEntry1] : #1  Generalized fatigue tiredness dyspnea on exertion.  EKG at baseline sinus bradycardia.  Known history of nonobstructive coronary artery disease.  Mild to moderate mitral regurgitation by echocardiogram in the past.  Will obtain labs to rule out any metabolic etiology.  He will have a stress myocardial perfusion scan to assess evaluate and rule out any significant obstructive coronary artery disease as well as evaluate for chronotropic incompetence which could be contributing to his fatigue tiredness with exertion.  At present there is no orthostatic shifts in the blood pressure noted.  And no other overt cardiovascular abnormality.\par #2 Mitral valve prolapse, mild to moderate mitral regurgitation. Normal pulmonary pressure. Borderline RV dimensions. Stable thoracic aortic dimensions.\par #3 coronary atherosclerosis nonobstructive. asymptomatic. Continue lifestyle and risk factor modifications were discussed. The prevention of future cardiovascular events and associated morbidity and mortality.  Management as discussed above.\par #4 essential hypertension. Hypertensive heart disease. No signs of congestive heart failure. No renal deficiency. Continue present medications.\par #5 hyperlipidemia  tolerating medications well. Continue lifestyle modification and follow  labs on a regular basis.\par #6 paroxysmal supraventricular tachycardia. Status post ablation of AVNRT. No symptomatic recurrence. Continue present regimen of medication.\par #7 thoracic aortic ectasia. Stable. No significant worsening. Continue blood pressure, heart rate control. \par He understands high risk symptoms to call 911. 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 Active lifestyle and regular. Exercise is along with weight maintenance is advised.\par All the above were at length reviewed. Answered all the questions. Thank you very much for this kind referral. Please do not hesitate to give me a call for any question.\par Part of this transcription was done with voice recognition software and phonetically similar errors are common. I apologize for that. Please donot hesitate to call for any questions due to above.\par \par Follow-up as advised

## 2020-06-30 NOTE — REASON FOR VISIT
[Follow-Up - Clinic] : a clinic follow-up of [Hyperlipidemia] : hyperlipidemia [Supraventricular Tachycardia] : supraventricular tachycardia [Abnormal ECG] : an abnormal ECG [Dyspnea] : dyspnea [FreeTextEntry1] : 74-year-old gentleman comes in for follow-up consultation at present with complaint of worsening fatigue tiredness and dyspnea on exertion in presence of according to him inability to increase his heart rate with baseline bradycardia.  He denies any associated chest pain.  There is no PND orthopnea pop dictation.  There is no dizziness near syncopal or syncopal event.\par He has not been active during COVID-19 health emergency.  And does not do regular exercises as before.\par No changes in medication.\par No claudication pain.\par Stable. Diet, activity, and weight.\par Stable. Sleep pattern.\par Diagnosis of Alfagal allergies [Fatigue] : feeling tired (fatigue)

## 2020-06-30 NOTE — REVIEW OF SYSTEMS
[Dyspnea on exertion] : dyspnea during exertion [Negative] : Heme/Lymph [Shortness Of Breath] : no shortness of breath [Chest  Pressure] : no chest pressure [Chest Pain] : no chest pain [Lower Ext Edema] : no extremity edema [Palpitations] : no palpitations [Leg Claudication] : no intermittent leg claudication

## 2020-06-30 NOTE — HISTORY OF PRESENT ILLNESS
[FreeTextEntry1] : Other active medical problems as noted\par \par *Essential hypertension. Hypertensive heart disease. On carvedilol, Amlodipine, and Valsartan-Hydrochlorothiazide.\par \par •dyslipidemia mixed : Tolerating livalo. \par \par •Coronary artery disease, nonobstructive on a CTA, per past note. Preserved LV systolic function.  Stable perfusion scan, per past note.  On antiplatelet agent, statin therapy and beta blockers.\par \par • thoracic aortic ectasia. Stable, remaining asymptomatic. \par \par •moderate mitral/aortic non rheumatic  regurgitation. Stable LV dimension, ejection fraction, no history or signs of left or right heart failure per past note. 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  Sinus bradycardia\par

## 2020-06-30 NOTE — PHYSICAL EXAM
[Normal Appearance] : normal appearance [General Appearance - Well Developed] : well developed [General Appearance - Well Nourished] : well nourished [Well Groomed] : well groomed [No Deformities] : no deformities [Normal Conjunctiva] : the conjunctiva exhibited no abnormalities [Eyelids - No Xanthelasma] : the eyelids demonstrated no xanthelasmas [General Appearance - In No Acute Distress] : no acute distress [No Oral Pallor] : no oral pallor [Exaggerated Use Of Accessory Muscles For Inspiration] : no accessory muscle use [] : no respiratory distress [Respiration, Rhythm And Depth] : normal respiratory rhythm and effort [Heart Rate And Rhythm] : heart rate and rhythm were normal [Heart Sounds] : normal S1 and S2 [Auscultation Breath Sounds / Voice Sounds] : lungs were clear to auscultation bilaterally [Veins - Varicosity Changes] : no varicosital changes were noted in the lower extremities [Arterial Pulses Normal] : the arterial pulses were normal [Edema] : no peripheral edema present [Abdomen Soft] : soft [Gait - Sufficient For Exercise Testing] : the gait was sufficient for exercise testing [Abnormal Walk] : normal gait [Cyanosis, Localized] : no localized cyanosis [Skin Color & Pigmentation] : normal skin color and pigmentation [Nail Clubbing] : no clubbing of the fingernails [Oriented To Time, Place, And Person] : oriented to person, place, and time [Affect] : the affect was normal [Mood] : the mood was normal [No Anxiety] : not feeling anxious [FreeTextEntry1] : 1-2/6 lsb sm, no gallop/rub/heave or click

## 2020-07-06 ENCOUNTER — APPOINTMENT (OUTPATIENT)
Dept: CARDIOLOGY | Facility: CLINIC | Age: 75
End: 2020-07-06
Payer: MEDICARE

## 2020-07-06 VITALS
SYSTOLIC BLOOD PRESSURE: 118 MMHG | WEIGHT: 208 LBS | TEMPERATURE: 97.8 F | HEART RATE: 77 BPM | DIASTOLIC BLOOD PRESSURE: 70 MMHG | HEIGHT: 73 IN | BODY MASS INDEX: 27.57 KG/M2 | OXYGEN SATURATION: 98 %

## 2020-07-06 PROCEDURE — 99214 OFFICE O/P EST MOD 30 MIN: CPT

## 2020-07-06 PROCEDURE — 0296T: CPT

## 2020-07-06 NOTE — ASSESSMENT
[FreeTextEntry1] : past tests for reference:\par \par CTA of the coronary arteries in 2009 was overall nonobstructive \par \par •Carotid Doppler study 7-30-14 showed mild atherosclerosis, nonobstructive. No significant change when compared to before.\par  Stress myocardial perfusion scan. 10/6/2015. 7 minutes and 26 seconds of Eber protocol 10.1 METs of workload.Nonischemic myocardial perfusion scan in a limited study.\par labs 7-29-16 Na+ 144, K 4.2, bun/creat 19/1.09, AST 21, ALT 27, LDL 83, HDL 44, triglycerides 105, hgba1c 5.8, wbc 3.8, h/h 14.2/41.5, plat 161\par  ekg 11-22-16 NSR with 1av and nsstt. \par •echo 11-14-16 ef 60% with moderate MR, calcified trileaflet AV with normal opening, mild to moderate AR, aortic rot 3.3cm, moderate LAE, normal ventricular function, midl diastolic dysfunctioin, minimal TR, normal pulmonary pressures. \par Carotid Doppler study December 20, 2017. Nose significant obstructive disease.\par Echocardiogram dated December 28 2017. LV ejection fraction 60% mild mitral and aortic regurgitation.\par Labs December 2, 2017. He is stable. CBC. Sodium 144, potassium 4.6, creatinine 1.08. LFTs normal. Total cholesterol 136.  LDL 70.\par \par Reviewed on July 26, 2018.\par EKG ordered and inspected by me. July 26, 2018. Indication palpitations, being dictation. Normal sinus rhythm. First-degree AV block. Normal intervals.\par \par Reviewed on December 19, 2018.\par Echocardiogram December 19, 2018. LV ejection fraction 55% with mitral prolapse. Mild to moderate mitral regurgitation. Ascending aorta, 4 cm. Borderline right ventricular dimensions. Mild left and right atrial enlargement. Normal pulmonary artery systolic pressure.\par Labs from November 10, 2018 were reviewed. HDL 43, LDL 73, triglycerides 90. LFT was normal. CBC and BMP were stable.\par \par Reviewed on July 24, 2019\par EKG. Sinus bradycardia , poor R-wave progression.\par \par Reviewed on December 31, 2019\par Labs December 26, 2019 was reviewed, which showed stable CBC, CMP, TSH, lipid panel.\par Echocardiogram December 31, 2019 EF 60% mitral prolapse mild-to-moderate mitral regurgitation, mild aortic regurgitation, normal left atrial size pulmonary pressures. 19. Overall, no significant new changes. P.\par \par Reviewed on July 6, 2020.\par Reviewed EKGs chest x-ray labs from emergency room at [facility].

## 2020-07-06 NOTE — REASON FOR VISIT
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  According to him on July 3, 2020 he noticed faster heart rate around 120 with palpitation.  He also noticed higher blood pressure.  Lasted for many minutes.  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  When he arrived at the hospital he felt normal.  And since then he has not had any recurrence.\par His EKG in the EMT had shown sinus tachycardia with PACs.  EKG at [facility] had shown normal sinus rhythm.  With premature atrial beat.  Baseline first-degree AV block.  Nonspecific ST-T changes.\par His chest x-ray did not reveal any significant abnormality\par His labs are shown stable CMP troponin was negative and CBC was stable.\par He did not have any increased caffeine alcohol intake.  Denies any taking antihistamines.\par He has remained relatively active though recently less than before because of COVID-19.\par Stable. Diet, activity, and weight.\par Stable. Sleep pattern.\par Diagnosis of Alfagal allergies [Follow-Up - From Hospitalization] : follow-up of a recent hospitalization for [Palpitations] : palpitations

## 2020-07-06 NOTE — DISCUSSION/SUMMARY
[FreeTextEntry1] : #1  Sinus tachycardia, short episodes of palpitation associated with ventricular rate around 120 with elevated blood pressure.  EKG at baseline with normal sinus rhythm first-degree AV block PACs.  Most of the time he has noticed bradycardia.\par 7-day event recorder to rule out PSVT considering his prior history and or other atrial or ventricular arrhythmias.\par Low-dose metoprolol ordered at 12.5 mg long-acting considering baseline bradycardia arrhythmias.\par Based on about test we can discuss further whether he would benefit from further evaluation which may include implantable loop recorder/EP consultation and management.\par He is also recommended to increase his fluid intake avoid aggressive work in the hot and humid environment\par If any significant worsening he will contact us or call 911.  He has verbalized understanding.\par #2 Mitral valve prolapse, mild to moderate mitral regurgitation. Normal pulmonary pressure. Borderline RV dimensions. Stable thoracic aortic dimensions.\par #3 coronary atherosclerosis nonobstructive. asymptomatic. Continue lifestyle and risk factor modifications were discussed. The prevention of future cardiovascular events and associated morbidity and mortality.  Management as discussed above.\par #4 essential hypertension. Hypertensive heart disease. No signs of congestive heart failure. No renal deficiency. Continue present medications.\par #5 hyperlipidemia  tolerating medications well. Continue lifestyle modification and follow  labs on a regular basis.\par #6 paroxysmal supraventricular tachycardia. Status post ablation of AVNRT. No symptomatic recurrence. Continue present regimen of medication.\par #7 thoracic aortic ectasia. Stable. No significant worsening. Continue blood pressure, heart rate control. \par He understands high risk symptoms to call 911. 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 Active lifestyle and regular. Exercise is along with weight maintenance is advised.\par All the above were at length reviewed. Answered all the questions. Thank you very much for this kind referral. Please do not hesitate to give me a call for any question.\par Part of this transcription was done with voice recognition software and phonetically similar errors are common. I apologize for that. Please donot hesitate to call for any questions due to above.\par \par Follow-up as advised

## 2020-07-06 NOTE — REVIEW OF SYSTEMS
[Dyspnea on exertion] : dyspnea during exertion [Negative] : Heme/Lymph [Chest  Pressure] : no chest pressure [Chest Pain] : no chest pain [Shortness Of Breath] : no shortness of breath [Lower Ext Edema] : no extremity edema [Leg Claudication] : no intermittent leg claudication [Palpitations] : palpitations

## 2020-07-06 NOTE — PHYSICAL EXAM
[General Appearance - Well Developed] : well developed [Normal Appearance] : normal appearance [Well Groomed] : well groomed [No Deformities] : no deformities [General Appearance - Well Nourished] : well nourished [General Appearance - In No Acute Distress] : no acute distress [Eyelids - No Xanthelasma] : the eyelids demonstrated no xanthelasmas [Normal Conjunctiva] : the conjunctiva exhibited no abnormalities [No Oral Pallor] : no oral pallor [] : no respiratory distress [Respiration, Rhythm And Depth] : normal respiratory rhythm and effort [Exaggerated Use Of Accessory Muscles For Inspiration] : no accessory muscle use [Auscultation Breath Sounds / Voice Sounds] : lungs were clear to auscultation bilaterally [Heart Rate And Rhythm] : heart rate and rhythm were normal [Heart Sounds] : normal S1 and S2 [Arterial Pulses Normal] : the arterial pulses were normal [Edema] : no peripheral edema present [Veins - Varicosity Changes] : no varicosital changes were noted in the lower extremities [Abdomen Soft] : soft [Abnormal Walk] : normal gait [Gait - Sufficient For Exercise Testing] : the gait was sufficient for exercise testing [Cyanosis, Localized] : no localized cyanosis [Nail Clubbing] : no clubbing of the fingernails [Skin Color & Pigmentation] : normal skin color and pigmentation [Oriented To Time, Place, And Person] : oriented to person, place, and time [Affect] : the affect was normal [Mood] : the mood was normal [No Anxiety] : not feeling anxious [FreeTextEntry1] : no JVD

## 2020-07-17 PROCEDURE — 0298T: CPT | Mod: 59

## 2020-07-17 PROCEDURE — 0298T: CPT

## 2020-07-22 ENCOUNTER — APPOINTMENT (OUTPATIENT)
Dept: CARDIOLOGY | Facility: CLINIC | Age: 75
End: 2020-07-22

## 2020-07-29 ENCOUNTER — APPOINTMENT (OUTPATIENT)
Dept: CARDIOLOGY | Facility: CLINIC | Age: 75
End: 2020-07-29
Payer: MEDICARE

## 2020-07-29 PROCEDURE — 93015 CV STRESS TEST SUPVJ I&R: CPT

## 2020-07-29 PROCEDURE — 78452 HT MUSCLE IMAGE SPECT MULT: CPT

## 2020-07-29 PROCEDURE — A9502: CPT

## 2020-08-03 ENCOUNTER — APPOINTMENT (OUTPATIENT)
Dept: CARDIOLOGY | Facility: CLINIC | Age: 75
End: 2020-08-03
Payer: MEDICARE

## 2020-08-03 VITALS
OXYGEN SATURATION: 95 % | SYSTOLIC BLOOD PRESSURE: 128 MMHG | BODY MASS INDEX: 27.57 KG/M2 | WEIGHT: 208 LBS | HEART RATE: 51 BPM | DIASTOLIC BLOOD PRESSURE: 64 MMHG | HEIGHT: 73 IN

## 2020-08-03 PROCEDURE — 99214 OFFICE O/P EST MOD 30 MIN: CPT

## 2020-08-03 NOTE — DISCUSSION/SUMMARY
[FreeTextEntry1] : NAEEM RUGGIERO  is a 74 year M  who presents today Aug 03, 2020 with the above history and the following active issues. \par \par #1 Coronary atherosclerosis nonobstructive by past CTA. Nuclear stress test suggestive of mild ischemia. NSVT on ZIO monitor. Recommend further ischemic evaluation with left heart cath at [facility]. Risks and benefits reviewed at length. Continue beta blocker, statin and ASA. If any new symptoms ER evaluation. \par \par #2 Mitral valve prolapse, mild to moderate mitral regurgitation. Normal pulmonary pressure. Borderline RV dimensions. Stable thoracic aortic dimensions.\par \par #3 Essential hypertension. Hypertensive heart disease. No signs of congestive heart failure. No renal deficiency. Continue present medications.\par \par #4 hyperlipidemia  tolerating medications well. Continue lifestyle modification and follow  labs on a regular basis.\par \par #5 paroxysmal supraventricular tachycardia. Status post ablation of AVNRT. No symptomatic recurrence. Continue present regimen of medication.\par \par #6 thoracic aortic ectasia. Stable. No significant worsening. Continue blood pressure, heart rate control. \par He understands high risk symptoms to call 911. He works as volunteer ambulance staff. Avoid aggressive isometric exercises\par \par Red flag symptoms which would warrant sooner emergent evaluation reviewed with the patient. \par Questions and concerns were addressed and answered. \par \par Sincerely,\par \par Radha Hudson PA-C\par Patients history, testing and plan reviewed with supervising MD: Dr. Karthikeyan Marino

## 2020-08-03 NOTE — REASON FOR VISIT
[FreeTextEntry1] : 74-year-old gentleman was seen in the office for follow-up to review nuclear stress test and ZIO monitor. \par Recently patient was having palpitations. His EKG from  EMT friend had shown sinus tachycardia with PACs.  EKG at [facility] had shown normal sinus rhythm.  With premature atrial beat.  Baseline first-degree AV block.  Nonspecific ST-T changes. Continues to feel fatigue. \par \par Today he denies chest pain, pressure, unusual shortness of breath, lightheadedness, dizziness, near syncope or syncope. \par \par

## 2020-08-03 NOTE — ASSESSMENT
[FreeTextEntry1] : past tests for reference:\par \par CTA of the coronary arteries in 2009 was overall nonobstructive \par \par •Carotid Doppler study 7-30-14 showed mild atherosclerosis, nonobstructive. No significant change when compared to before.\par  Stress myocardial perfusion scan. 10/6/2015. 7 minutes and 26 seconds of Eber protocol 10.1 METs of workload.Nonischemic myocardial perfusion scan in a limited study.\par labs 7-29-16 Na+ 144, K 4.2, bun/creat 19/1.09, AST 21, ALT 27, LDL 83, HDL 44, triglycerides 105, hgba1c 5.8, wbc 3.8, h/h 14.2/41.5, plat 161\par  ekg 11-22-16 NSR with 1av and nsstt. \par •echo 11-14-16 ef 60% with moderate MR, calcified trileaflet AV with normal opening, mild to moderate AR, aortic rot 3.3cm, moderate LAE, normal ventricular function, midl diastolic dysfunctioin, minimal TR, normal pulmonary pressures. \par Carotid Doppler study December 20, 2017. Nose significant obstructive disease.\par Echocardiogram dated December 28 2017. LV ejection fraction 60% mild mitral and aortic regurgitation.\par Labs December 2, 2017. He is stable. CBC. Sodium 144, potassium 4.6, creatinine 1.08. LFTs normal. Total cholesterol 136.  LDL 70.\par \par Reviewed on July 26, 2018.\par EKG ordered and inspected by me. July 26, 2018. Indication palpitations, being dictation. Normal sinus rhythm. First-degree AV block. Normal intervals.\par \par Reviewed on December 19, 2018.\par Echocardiogram December 19, 2018. LV ejection fraction 55% with mitral prolapse. Mild to moderate mitral regurgitation. Ascending aorta, 4 cm. Borderline right ventricular dimensions. Mild left and right atrial enlargement. Normal pulmonary artery systolic pressure.\par Labs from November 10, 2018 were reviewed. HDL 43, LDL 73, triglycerides 90. LFT was normal. CBC and BMP were stable.\par \par Reviewed on July 24, 2019\par EKG. Sinus bradycardia , poor R-wave progression.\par \par Reviewed on December 31, 2019\par Labs December 26, 2019 was reviewed, which showed stable CBC, CMP, TSH, lipid panel.\par Echocardiogram December 31, 2019 EF 60% mitral prolapse mild-to-moderate mitral regurgitation, mild aortic regurgitation, normal left atrial size pulmonary pressures. 19. Overall, no significant new changes. P.\par \par Reviewed on July 6, 2020.\par Reviewed EKGs chest x-ray labs from emergency room at [place].\par \par ZIO monitor July 2020 - SR, 16 beat NSVT\par \par Nuclear stress test July 2020 - small mild defect in apical lateral wall that is reversible suggestive of mild ischemia

## 2020-08-03 NOTE — REVIEW OF SYSTEMS
[Dyspnea on exertion] : dyspnea during exertion [Negative] : Heme/Lymph [Shortness Of Breath] : no shortness of breath [Chest  Pressure] : no chest pressure [Chest Pain] : no chest pain [Lower Ext Edema] : no extremity edema [Leg Claudication] : no intermittent leg claudication

## 2020-08-03 NOTE — HISTORY OF PRESENT ILLNESS
[FreeTextEntry1] : Other active medical problems as noted\par \par *Essential hypertension. Hypertensive heart disease. On carvedilol, Amlodipine, and Valsartan-Hydrochlorothiazide.\par \par •dyslipidemia mixed : Tolerating livalo. \par \par •Coronary artery disease, nonobstructive on a CTA, per past note. Preserved LV systolic function. On antiplatelet agent, statin therapy and beta blockers.\par \par • thoracic aortic ectasia. Stable, remaining asymptomatic. \par \par •moderate mitral/aortic non rheumatic  regurgitation. Stable LV dimension, ejection fraction, no history or signs of left or right heart failure per past note. 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  Sinus bradycardia\par

## 2020-08-05 ENCOUNTER — OUTPATIENT (OUTPATIENT)
Dept: OUTPATIENT SERVICES | Facility: HOSPITAL | Age: 75
LOS: 1 days | End: 2020-08-05
Payer: MEDICARE

## 2020-08-05 ENCOUNTER — TRANSCRIPTION ENCOUNTER (OUTPATIENT)
Age: 75
End: 2020-08-05

## 2020-08-05 VITALS
DIASTOLIC BLOOD PRESSURE: 81 MMHG | HEART RATE: 61 BPM | OXYGEN SATURATION: 96 % | SYSTOLIC BLOOD PRESSURE: 162 MMHG | RESPIRATION RATE: 15 BRPM

## 2020-08-05 VITALS — WEIGHT: 207.9 LBS | HEIGHT: 73 IN

## 2020-08-05 DIAGNOSIS — I25.10 ATHEROSCLEROTIC HEART DISEASE OF NATIVE CORONARY ARTERY WITHOUT ANGINA PECTORIS: ICD-10-CM

## 2020-08-05 DIAGNOSIS — Z90.89 ACQUIRED ABSENCE OF OTHER ORGANS: Chronic | ICD-10-CM

## 2020-08-05 DIAGNOSIS — Z98.890 OTHER SPECIFIED POSTPROCEDURAL STATES: Chronic | ICD-10-CM

## 2020-08-05 DIAGNOSIS — I47.2 VENTRICULAR TACHYCARDIA: Chronic | ICD-10-CM

## 2020-08-05 DIAGNOSIS — R94.39 ABNORMAL RESULT OF OTHER CARDIOVASCULAR FUNCTION STUDY: ICD-10-CM

## 2020-08-05 LAB
ANION GAP SERPL CALC-SCNC: 12 MMOL/L — SIGNIFICANT CHANGE UP (ref 5–17)
APTT BLD: 38.4 SEC — HIGH (ref 27.5–35.5)
BUN SERPL-MCNC: 17 MG/DL — SIGNIFICANT CHANGE UP (ref 8–20)
CALCIUM SERPL-MCNC: 9.9 MG/DL — SIGNIFICANT CHANGE UP (ref 8.6–10.2)
CHLORIDE SERPL-SCNC: 100 MMOL/L — SIGNIFICANT CHANGE UP (ref 98–107)
CO2 SERPL-SCNC: 30 MMOL/L — HIGH (ref 22–29)
CREAT SERPL-MCNC: 1.01 MG/DL — SIGNIFICANT CHANGE UP (ref 0.5–1.3)
GLUCOSE SERPL-MCNC: 109 MG/DL — HIGH (ref 70–99)
HCT VFR BLD CALC: 46.3 % — SIGNIFICANT CHANGE UP (ref 39–50)
HGB BLD-MCNC: 15.3 G/DL — SIGNIFICANT CHANGE UP (ref 13–17)
INR BLD: 0.91 RATIO — SIGNIFICANT CHANGE UP (ref 0.88–1.16)
MAGNESIUM SERPL-MCNC: 2.2 MG/DL — SIGNIFICANT CHANGE UP (ref 1.8–2.6)
MCHC RBC-ENTMCNC: 32.3 PG — SIGNIFICANT CHANGE UP (ref 27–34)
MCHC RBC-ENTMCNC: 33 GM/DL — SIGNIFICANT CHANGE UP (ref 32–36)
MCV RBC AUTO: 97.9 FL — SIGNIFICANT CHANGE UP (ref 80–100)
PLATELET # BLD AUTO: 190 K/UL — SIGNIFICANT CHANGE UP (ref 150–400)
POTASSIUM SERPL-MCNC: 4 MMOL/L — SIGNIFICANT CHANGE UP (ref 3.5–5.3)
POTASSIUM SERPL-SCNC: 4 MMOL/L — SIGNIFICANT CHANGE UP (ref 3.5–5.3)
PROTHROM AB SERPL-ACNC: 10.6 SEC — SIGNIFICANT CHANGE UP (ref 10.6–13.6)
RBC # BLD: 4.73 M/UL — SIGNIFICANT CHANGE UP (ref 4.2–5.8)
RBC # FLD: 12 % — SIGNIFICANT CHANGE UP (ref 10.3–14.5)
SARS-COV-2 RNA SPEC QL NAA+PROBE: SIGNIFICANT CHANGE UP
SODIUM SERPL-SCNC: 142 MMOL/L — SIGNIFICANT CHANGE UP (ref 135–145)
WBC # BLD: 6.05 K/UL — SIGNIFICANT CHANGE UP (ref 3.8–10.5)
WBC # FLD AUTO: 6.05 K/UL — SIGNIFICANT CHANGE UP (ref 3.8–10.5)

## 2020-08-05 PROCEDURE — C1894: CPT

## 2020-08-05 PROCEDURE — C1887: CPT

## 2020-08-05 PROCEDURE — 85610 PROTHROMBIN TIME: CPT

## 2020-08-05 PROCEDURE — 99153 MOD SED SAME PHYS/QHP EA: CPT

## 2020-08-05 PROCEDURE — C1760: CPT

## 2020-08-05 PROCEDURE — C1769: CPT

## 2020-08-05 PROCEDURE — 83735 ASSAY OF MAGNESIUM: CPT

## 2020-08-05 PROCEDURE — 85730 THROMBOPLASTIN TIME PARTIAL: CPT

## 2020-08-05 PROCEDURE — 85027 COMPLETE CBC AUTOMATED: CPT

## 2020-08-05 PROCEDURE — 93005 ELECTROCARDIOGRAM TRACING: CPT

## 2020-08-05 PROCEDURE — 80048 BASIC METABOLIC PNL TOTAL CA: CPT

## 2020-08-05 PROCEDURE — 99152 MOD SED SAME PHYS/QHP 5/>YRS: CPT

## 2020-08-05 PROCEDURE — 93010 ELECTROCARDIOGRAM REPORT: CPT

## 2020-08-05 PROCEDURE — 93458 L HRT ARTERY/VENTRICLE ANGIO: CPT

## 2020-08-05 PROCEDURE — 93458 L HRT ARTERY/VENTRICLE ANGIO: CPT | Mod: 26

## 2020-08-05 PROCEDURE — 87635 SARS-COV-2 COVID-19 AMP PRB: CPT

## 2020-08-05 PROCEDURE — 36415 COLL VENOUS BLD VENIPUNCTURE: CPT

## 2020-08-05 RX ORDER — RANITIDINE HYDROCHLORIDE 150 MG/1
0 TABLET, FILM COATED ORAL
Qty: 0 | Refills: 0 | DISCHARGE

## 2020-08-05 RX ORDER — ASPIRIN/CALCIUM CARB/MAGNESIUM 324 MG
81 TABLET ORAL ONCE
Refills: 0 | Status: DISCONTINUED | OUTPATIENT
Start: 2020-08-05 | End: 2020-08-20

## 2020-08-05 RX ORDER — CLONAZEPAM 1 MG
0 TABLET ORAL
Qty: 0 | Refills: 0 | DISCHARGE

## 2020-08-05 RX ORDER — AMLODIPINE BESYLATE 2.5 MG/1
0 TABLET ORAL
Qty: 0 | Refills: 0 | DISCHARGE

## 2020-08-05 RX ORDER — MIDAZOLAM HYDROCHLORIDE 1 MG/ML
1 INJECTION, SOLUTION INTRAMUSCULAR; INTRAVENOUS ONCE
Refills: 0 | Status: DISCONTINUED | OUTPATIENT
Start: 2020-08-05 | End: 2020-08-05

## 2020-08-05 RX ORDER — CARVEDILOL PHOSPHATE 80 MG/1
0 CAPSULE, EXTENDED RELEASE ORAL
Qty: 0 | Refills: 0 | DISCHARGE

## 2020-08-05 RX ORDER — ASPIRIN/CALCIUM CARB/MAGNESIUM 324 MG
1 TABLET ORAL
Qty: 0 | Refills: 0 | DISCHARGE

## 2020-08-05 RX ADMIN — MIDAZOLAM HYDROCHLORIDE 1 MILLIGRAM(S): 1 INJECTION, SOLUTION INTRAMUSCULAR; INTRAVENOUS at 11:48

## 2020-08-05 NOTE — DISCHARGE NOTE NURSING/CASE MANAGEMENT/SOCIAL WORK - PATIENT PORTAL LINK FT
You can access the FollowMyHealth Patient Portal offered by [facility] by registering at the following website: [email or web address]. By joining [facility]’s FollowMyHealth portal, you will also be able to view your health information using other applications (apps) compatible with our system.

## 2020-08-05 NOTE — H&P PST ADULT - RS GEN PE MLT RESP DETAILS PC
breath sounds equal/respirations non-labored/airway patent/normal/no rales/no chest wall tenderness/clear to auscultation bilaterally/good air movement

## 2020-08-05 NOTE — H&P PST ADULT - NEUROLOGICAL DETAILS
responds to pain/alert and oriented x 3/responds to verbal commands/deep reflexes intact/cranial nerves intact/sensation intact/normal strength/no spontaneous movement

## 2020-08-05 NOTE — H&P PST ADULT - ASSESSMENT
75 y/o M with HTN, HLD, NSVT subsequent RF ablation (2017); now with abnormal stress test, palpitations, DEL ROSARIO with minimal activity; here for LHC with AP

## 2020-08-05 NOTE — H&P PST ADULT - NSICDXPROBLEM_GEN_ALL_CORE_FT
PROBLEM DIAGNOSES  Problem: Abnormal stress test  Assessment and Plan: -left heart cardiac catheterization with Dr. Alexander  -procedure d/w pt, risks and benefits explained, questions answered  -consent obtained  -labs, EKG, stress report, office notes reviewed pre procedure

## 2020-08-05 NOTE — H&P PST ADULT - NEGATIVE NEUROLOGICAL SYMPTOMS
no loss of consciousness/no tremors/no vertigo/no loss of sensation/no paresthesias/no syncope/no transient paralysis/no weakness/no generalized seizures/no focal seizures/no headache

## 2020-08-05 NOTE — DISCHARGE NOTE PROVIDER - CARE PROVIDER_API CALL
Karthikeyan Marino (MD)  Cardiovascular Disease; Internal Medicine; Nuclear Cardiology  [address] [place] [address]  Phone: [phone]  Fax: [phone]  Follow Up Time:

## 2020-08-05 NOTE — H&P PST ADULT - NSICDXPASTSURGICALHX_GEN_ALL_CORE_FT
PAST SURGICAL HISTORY:  H/O hand surgery     History of cardiac radiofrequency ablation (RFA) 2017 with Dr. Donaldo yang at     NSVT (nonsustained ventricular tachycardia)     S/P tonsillectomy

## 2020-08-05 NOTE — DISCHARGE NOTE PROVIDER - NSDCQMSTROKE_NEU_ALL_CORE
No
Has The Cancer Been Biopsied Before?: has been previously biopsied
How Many Lesions Are To Be Evaluated?: 2
Has This Lesion Been Treated Previously?: not been treated
Is This A New Presentation, Or A Follow-Up?: Mohs Surgery Consultation
Who Referred The Patient?: NELSON Hickman MD
What Is The Location Of The First Lesion?: right nasal sidewall
What Is The Location Of The Second Lesion?: left nasal sidewall
When Was Your Biopsy?: 12/14/2018 accession number: [id number]. Bx results showed Basal cell carcinoma, nodular type
When Was Your Biopsy?: 8/20/2012 accession number: [id number]. BX results showed Basal cell carcinoma, nodular variant

## 2020-08-05 NOTE — H&P PST ADULT - NSICDXPASTMEDICALHX_GEN_ALL_CORE_FT
PAST MEDICAL HISTORY:  BPH (benign prostatic hyperplasia)     GERD (gastroesophageal reflux disease)     Palpitations     SVT (supraventricular tachycardia)

## 2020-08-05 NOTE — DISCHARGE NOTE PROVIDER - HOSPITAL COURSE
73 y/o M, never smoker, with PMHx HTN, HLD, CAD, BPH, thoracic aortic ectasia, mild MR, AR, SVT RF ablation  (AVNRT; 2/26/17), BPH presents today in anticipation of a left heart cardiac catheterization with Dr. Alexander.  He endorses SOB, palpitations and fatigue, denies chest pain, lower extremity edema, pre syncope, syncope, fever, chills, N/V.         now s/p left heart catheterization via right groin approach (angioseal used, no site complications) with Dr. Alexander    failed right radial approach initially    right groin precautions; angioseal used    LM normal    LAD normal    Cx normal    RCA normal    no cardiac intervention        -continue current medical therapy    -bedrest x1 hour post cath    -ambulate and home 30 minutes after bedrest complete    -follow up outpt with cardiologist as needed

## 2020-08-05 NOTE — H&P PST ADULT - HISTORY OF PRESENT ILLNESS
Narrative: 75 y/o M, never smoker, with PMHx HTN, HLD, CAD, BPH, thoracic aortic ectasia, mild MR, AR, SVT RF ablation  (AVNRT; 2/26/17), BPH presents today in anticipation of a left heart cardiac catheterization with Dr. Alexander.  He endorses SOB, palpitations and fatigue, denies chest pain, lower extremity edema, pre syncope, syncope, fever, chills, N/V.     Stress 7/29/20 rhythm sinus bradycardia with T wave inversions lead III EF 56%, normal LV dysfunction, LV normal in size, small, mild defect in apical lateral lateral wall that is partially reversible, suggestive of mild ischemia    Echo 12/31/19: mitral valve prolapse involving the posterior mitral leaflet, mild to mod MR, thickened aortic valve, mild AR, normal LV and wall thickness, normal LV systolic fxn, no segmental wall motion abnormalities, mild diastolic dysfunction mild RA enlgmt, no intracardiac masses or thrombus seen    ZIO monitor 7/2020 revealed SR, 16 beats NSVT    ASA  MALL  BRA  Cr  GFR Narrative: 73 y/o M, never smoker, with PMHx HTN, HLD, CAD, BPH, thoracic aortic ectasia, mild MR, AR, SVT RF ablation  (AVNRT; 2/26/17), BPH presents today in anticipation of a left heart cardiac catheterization with Dr. Alexander.  He endorses SOB, palpitations and fatigue, denies chest pain, lower extremity edema, pre syncope, syncope, fever, chills, N/V.     Stress 7/29/20 rhythm sinus bradycardia with T wave inversions lead III EF 56%, normal LV dysfunction, LV normal in size, small, mild defect in apical lateral lateral wall that is partially reversible, suggestive of mild ischemia    Echo 12/31/19: mitral valve prolapse involving the posterior mitral leaflet, mild to mod MR, thickened aortic valve, mild AR, normal LV and wall thickness, normal LV systolic fxn, no segmental wall motion abnormalities, mild diastolic dysfunction mild RA enlgmt, no intracardiac masses or thrombus seen    ZIO monitor 7/2020 revealed SR, 16 beats NSVT    ASA 2  MALL 2  BRA 1.4%  Cr 1.01  GFR 72

## 2020-08-05 NOTE — DISCHARGE NOTE PROVIDER - NSDCCPTREATMENT_GEN_ALL_CORE_FT
PRINCIPAL PROCEDURE  Procedure: Cardiac catheterization, left heart  Findings and Treatment: No heavy lifting, driving, sex, tub baths, swimming, or any activity that submerges the lower half of the body in water for 48 hours.  Limited walking and stairs for 48 hours.    Change the bandaid after 24 hours and every 24 hours after that.  Keep the puncture site dry and covered with a bandaid until a scab forms.    Observe the site frequently.  If bleeding or a large lump (the size of a golf ball or bigger) occurs lie flat, apply continuous direct pressure just above the puncture site for at least 10 minutes, and notify your physician immediately.  If the bleeding cannot be controlled, call 911 immediately for assistance.  Notify your physician of pain, swelling or any drainage.    Notify your physician immediately if coldness, numbness, discoloration or pain in your foot occurs.

## 2020-08-05 NOTE — DISCHARGE NOTE PROVIDER - NSDCCPCAREPLAN_GEN_ALL_CORE_FT
PRINCIPAL DISCHARGE DIAGNOSIS  Diagnosis: Abnormal stress test  Assessment and Plan of Treatment: -your left heart cardiac catheterization (coronary angiogram) revealed normal coronary vasculature  -continue all of your medications as directed  -follow up with Dr. Marino (cardiologist) as needed

## 2020-08-05 NOTE — DISCHARGE NOTE PROVIDER - NSDCMRMEDTOKEN_GEN_ALL_CORE_FT
aspirin 81 mg oral tablet: orally once a day  Avodart 0.5 mg oral capsule:  orally   clonazePAM 1 mg oral tablet:   Co Q-10 100 mg oral capsule:  orally   Livalo 4 mg oral tablet:  orally   metoprolol succinate 25 mg oral tablet, extended release: 1 tab(s) orally once a day  PriLOSEC OTC 20 mg oral delayed release tablet: 1 tab(s) orally once a day  Probiotic Formula oral capsule:   telmisartan-hydrochlorothiazide 80mg-12.5mg oral tablet: 1 tab(s) orally once a day  Vitamin D3 1000 intl units oral tablet:  orally

## 2020-08-07 ENCOUNTER — APPOINTMENT (OUTPATIENT)
Dept: CARDIOLOGY | Facility: CLINIC | Age: 75
End: 2020-08-07
Payer: MEDICARE

## 2020-08-07 VITALS
OXYGEN SATURATION: 97 % | SYSTOLIC BLOOD PRESSURE: 130 MMHG | TEMPERATURE: 97.3 F | BODY MASS INDEX: 27.57 KG/M2 | HEART RATE: 50 BPM | DIASTOLIC BLOOD PRESSURE: 90 MMHG | WEIGHT: 208 LBS | HEIGHT: 73 IN

## 2020-08-07 PROCEDURE — 99214 OFFICE O/P EST MOD 30 MIN: CPT

## 2020-08-07 RX ORDER — OMEPRAZOLE MAGNESIUM 20 MG/1
TABLET, DELAYED RELEASE ORAL
Refills: 0 | Status: DISCONTINUED | COMMUNITY
End: 2020-08-07

## 2020-08-12 ENCOUNTER — TRANSCRIPTION ENCOUNTER (OUTPATIENT)
Age: 75
End: 2020-08-12

## 2020-08-18 ENCOUNTER — APPOINTMENT (OUTPATIENT)
Dept: CARDIOLOGY | Facility: CLINIC | Age: 75
End: 2020-08-18
Payer: MEDICARE

## 2020-08-18 VITALS
SYSTOLIC BLOOD PRESSURE: 122 MMHG | OXYGEN SATURATION: 95 % | HEART RATE: 55 BPM | HEIGHT: 73 IN | DIASTOLIC BLOOD PRESSURE: 76 MMHG | BODY MASS INDEX: 27.83 KG/M2 | TEMPERATURE: 98 F | WEIGHT: 210 LBS

## 2020-08-18 PROCEDURE — 99214 OFFICE O/P EST MOD 30 MIN: CPT

## 2020-08-18 NOTE — REASON FOR VISIT
[facility], located within [facility]    Subjective:     Chief Complaint:   Chief Complaint   Patient presents with    Right Knee - Pain     Right knee pain x years. She had x rays done at [facility] 3-11-19       Past Medical History:   Diagnosis Date    Bipolar 1 disorder     Diabetes mellitus, type 2     Knee injury     Lumbar pain     Parkinson's disease        Past Surgical History:   Procedure Laterality Date    ABLATION      CHOLECYSTECTOMY  08/27/2018    Dr. Petersen     knee and back surgery         Current Outpatient Medications   Medication Sig    atorvastatin (LIPITOR) 20 MG tablet Take 20 mg by mouth.    carbidopa-levodopa  mg (SINEMET)  mg per tablet Take 1 tablet by mouth.    divalproex ER (DEPAKOTE) 500 MG Tb24 Take 500 mg by mouth every evening.    ergocalciferol (ERGOCALCIFEROL) 50,000 unit Cap Take 50,000 Units by mouth.    gabapentin (NEURONTIN) 600 MG tablet Take 600 mg by mouth 3 (three) times daily.    hydroCHLOROthiazide (HYDRODIURIL) 25 MG tablet Take 25 mg by mouth daily as needed.    hydrOXYzine HCl (ATARAX) 25 MG tablet Take 25 mg by mouth.    levothyroxine (SYNTHROID) 88 MCG tablet Take 88 mcg by mouth once daily.    oxybutynin (DITROPAN-XL) 5 MG TR24 Take 1 tablet (5 mg total) by mouth once daily.    pramipexole (MIRAPEX) 0.125 MG tablet Take 0.125 mg by mouth.    mirabegron (MYRBETRIQ) 50 mg Tb24 Take 1 tablet (50 mg total) by mouth once daily.     No current facility-administered medications for this visit.        Review of patient's allergies indicates:   Allergen Reactions    Morphine     Pcn [penicillins]        Family History   Problem Relation Age of Onset    Cancer Father     Diabetes Father     Hyperlipidemia Father     Cancer Paternal Aunt     Cancer Maternal Grandfather     Hyperlipidemia Maternal Grandfather     Hyperlipidemia Paternal Grandmother     Hyperlipidemia Paternal Grandfather        Social History     Socioeconomic History    Marital status:       Spouse name: Not on file    Number of children: Not on file    Years of education: Not on file    Highest education level: Not on file   Occupational History    Not on file   Social Needs    Financial resource strain: Not on file    Food insecurity:     Worry: Not on file     Inability: Not on file    Transportation needs:     Medical: Not on file     Non-medical: Not on file   Tobacco Use    Smoking status: Former Smoker     Packs/day: 1.00     Types: Cigarettes    Smokeless tobacco: Never Used   Substance and Sexual Activity    Alcohol use: No     Frequency: Never    Drug use: No    Sexual activity: Not Currently   Lifestyle    Physical activity:     Days per week: Not on file     Minutes per session: Not on file    Stress: Not on file   Relationships    Social connections:     Talks on phone: Not on file     Gets together: Not on file     Attends Worship service: Not on file     Active member of club or organization: Not on file     Attends meetings of clubs or organizations: Not on file     Relationship status: Not on file   Other Topics Concern    Not on file   Social History Narrative    Not on file       History of present illness: Patient comes in today for her right knee. She's had years of severe right knee pain. She's had arthroscopy. She's had injections. She's got the point where she really can't ambulate without severe discomfort.      Review of Systems:    Constitution: Negative for chills, fever, and sweats.  Negative for unexplained weight loss.    HENT:  Negative for headaches and blurry vision.    Cardiovascular:Negative for chest pain or irregular heart beat. Negative for hypertension.    Respiratory:  Negative for cough and shortness of breath.    Gastrointestinal: Negative for abdominal pain, heartburn, melena, nausea, and vomitting.    Genitourinary:  Negative bladder incontinence and dysuria.    Musculoskeletal:  See HPI for details.     Neurological: Negative  for numbness.    Psychiatric/Behavioral: Negative for depression.  The patient is not nervous/anxious.      Endocrine: Negative for polyuria    Hematologic/Lymphatic: Negative for bleeding problem.  Does not bruise/bleed easily.    Skin: Negative for poor would healing and rash    Objective:      Physical Examination:    Vital Signs:    Vitals:    04/04/19 1321   BP: 130/80   Pulse: 83       Body mass index is 32.39 kg/m².    This a well-developed, well nourished patient in no acute distress.  They are alert and oriented and cooperative to examination.        Patient has range of motion from 5-90 degrees on the right knee. She has range of motion from 0-140 degrees in the left knee. She has a 2+ effusion on the right. Tremendous crepitus in the right.  Pertinent New Results:    XRAY Report / Interpretation:   AP lateral and sunrise views of her right knee demonstrate severe bone-on-bone arthritis of the right knee    Assessment/Plan:      Severe end-stage arthritis of the right knee. I've offered her a right total knee. Risks and benefits of discussed with her in great detail. We spoke a lot of about her depression and how she will handle this operation. She feels like she's doing very well. She is under psychiatric care. She will follow-up for her procedure      This note was created using Dragon voice recognition software that occasionally misinterpreted phrases or words.         [Abnormal ECG] : an abnormal ECG [Fatigue] : feeling tired (fatigue) [Dyspnea] : dyspnea [Hyperlipidemia] : hyperlipidemia [Supraventricular Tachycardia] : supraventricular tachycardia [Palpitations] : palpitations [Follow-Up - Clinic] : a clinic follow-up of [FreeTextEntry1] : 74-year-old gentleman was seen in the office for follow-up consultation after recent coronary angiography.\par He had no complications.\par He has no significant bleeding, swelling, soreness at the site of catheterization which included right radial and right femoral.\par He has no significant palpitation chest pain unusual shortness of breath PND orthopnea pedal edema\par He has no claudication pain

## 2020-08-18 NOTE — REVIEW OF SYSTEMS
[Dyspnea on exertion] : dyspnea during exertion [Palpitations] : palpitations [Negative] : Heme/Lymph [Shortness Of Breath] : no shortness of breath [Chest  Pressure] : no chest pressure [Chest Pain] : no chest pain [Lower Ext Edema] : no extremity edema [Leg Claudication] : no intermittent leg claudication

## 2020-08-18 NOTE — ASSESSMENT
[FreeTextEntry1] : past tests for reference:\par \par CTA of the coronary arteries in 2009 was overall nonobstructive \par \par •Carotid Doppler study 7-30-14 showed mild atherosclerosis, nonobstructive. No significant change when compared to before.\par  Stress myocardial perfusion scan. 10/6/2015. 7 minutes and 26 seconds of Eber protocol 10.1 METs of workload.Nonischemic myocardial perfusion scan in a limited study.\par labs 7-29-16 Na+ 144, K 4.2, bun/creat 19/1.09, AST 21, ALT 27, LDL 83, HDL 44, triglycerides 105, hgba1c 5.8, wbc 3.8, h/h 14.2/41.5, plat 161\par  ekg 11-22-16 NSR with 1av and nsstt. \par •echo 11-14-16 ef 60% with moderate MR, calcified trileaflet AV with normal opening, mild to moderate AR, aortic rot 3.3cm, moderate LAE, normal ventricular function, midl diastolic dysfunctioin, minimal TR, normal pulmonary pressures. \par Carotid Doppler study December 20, 2017. Nose significant obstructive disease.\par Echocardiogram dated December 28 2017. LV ejection fraction 60% mild mitral and aortic regurgitation.\par Labs December 2, 2017. He is stable. CBC. Sodium 144, potassium 4.6, creatinine 1.08. LFTs normal. Total cholesterol 136.  LDL 70.\par \par Reviewed on July 26, 2018.\par EKG ordered and inspected by me. July 26, 2018. Indication palpitations, being dictation. Normal sinus rhythm. First-degree AV block. Normal intervals.\par \par Reviewed on December 19, 2018.\par Echocardiogram December 19, 2018. LV ejection fraction 55% with mitral prolapse. Mild to moderate mitral regurgitation. Ascending aorta, 4 cm. Borderline right ventricular dimensions. Mild left and right atrial enlargement. Normal pulmonary artery systolic pressure.\par Labs from November 10, 2018 were reviewed. HDL 43, LDL 73, triglycerides 90. LFT was normal. CBC and BMP were stable.\par \par Reviewed on July 24, 2019\par EKG. Sinus bradycardia , poor R-wave progression.\par \par Reviewed on December 31, 2019\par Labs December 26, 2019 was reviewed, which showed stable CBC, CMP, TSH, lipid panel.\par Echocardiogram December 31, 2019 EF 60% mitral prolapse mild-to-moderate mitral regurgitation, mild aortic regurgitation, normal left atrial size pulmonary pressures. 19. Overall, no significant new changes. P.\par \par Reviewed on July 6, 2020.\par Reviewed EKGs chest x-ray labs from emergency room at [place].\par \par Reviewed on August 18, 2020\par Labs from August 5, 2020, coronary angiography August 5, 2020 Which showed minimal irregularities, event monitor July 6, 2020 showing nonsustained ventricular tachycardia\par Echocardiogram December 31, 2019 were all reviewed.

## 2020-08-18 NOTE — PHYSICAL EXAM
[General Appearance - Well Developed] : well developed [Well Groomed] : well groomed [Normal Appearance] : normal appearance [General Appearance - Well Nourished] : well nourished [No Deformities] : no deformities [General Appearance - In No Acute Distress] : no acute distress [Normal Conjunctiva] : the conjunctiva exhibited no abnormalities [No Oral Pallor] : no oral pallor [Eyelids - No Xanthelasma] : the eyelids demonstrated no xanthelasmas [] : no respiratory distress [Respiration, Rhythm And Depth] : normal respiratory rhythm and effort [Exaggerated Use Of Accessory Muscles For Inspiration] : no accessory muscle use [Auscultation Breath Sounds / Voice Sounds] : lungs were clear to auscultation bilaterally [Heart Rate And Rhythm] : heart rate and rhythm were normal [Heart Sounds] : normal S1 and S2 [Arterial Pulses Normal] : the arterial pulses were normal [Veins - Varicosity Changes] : no varicosital changes were noted in the lower extremities [Edema] : no peripheral edema present [Abdomen Soft] : soft [Gait - Sufficient For Exercise Testing] : the gait was sufficient for exercise testing [Abnormal Walk] : normal gait [Nail Clubbing] : no clubbing of the fingernails [Skin Color & Pigmentation] : normal skin color and pigmentation [Cyanosis, Localized] : no localized cyanosis [Oriented To Time, Place, And Person] : oriented to person, place, and time [Mood] : the mood was normal [No Anxiety] : not feeling anxious [Affect] : the affect was normal [FreeTextEntry1] : 1-2/6 lsb sm, no gallop/rub/heave or click

## 2020-08-18 NOTE — DISCUSSION/SUMMARY
[FreeTextEntry1] : 74-year-old gentleman with above medical history and active medical problems as noted below \par #1 NSVT, mildly abnormal nuclear myocardial perfusion scan, preserved LV systolic function now with minimal irregularity of coronary arteries on left heart cardiac catheterization/coronary angiography.\par Continue lifestyle and risk factor modifications.  Continue with low-dose of beta-blockers aspirin and statin therapy.\par No complications.\par Catheterization site stable.\par Reviewed present findings.  False positive nuclear marker perfusion scan.  Discussed risk and benefits of continue lifestyle modification as management plan with him.  Change in clinical status he will contact me.\par #2 Mitral valve prolapse, mild to moderate mitral regurgitation. Normal pulmonary pressure. Borderline RV dimensions. Stable thoracic aortic dimensions.\par #3  essential hypertension. Hypertensive heart disease. No signs of congestive heart failure. No renal deficiency. Continue present medications.\par #4 hyperlipidemia  tolerating medications well. Continue lifestyle modification and follow  labs on a regular basis.\par #5 paroxysmal supraventricular tachycardia. Status post ablation of AVNRT. No symptomatic recurrence. Continue present regimen of medication.\par #6 thoracic aortic ectasia. Stable. No significant worsening. Continue blood pressure, heart rate control. \par He understands high risk symptoms to call 911. 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 Active lifestyle and regular. Exercise is along with weight maintenance is advised.\par All the above were at length reviewed. Answered all the questions. Thank you very much for this kind referral. Please do not hesitate to give me a call for any question.\par Part of this transcription was done with voice recognition software and phonetically similar errors are common. I apologize for that. Please donot hesitate to call for any questions due to above.\par \par Follow-up as advised

## 2020-10-12 ENCOUNTER — APPOINTMENT (OUTPATIENT)
Dept: CARDIOLOGY | Facility: CLINIC | Age: 75
End: 2020-10-12
Payer: MEDICARE

## 2020-10-12 VITALS
OXYGEN SATURATION: 98 % | SYSTOLIC BLOOD PRESSURE: 132 MMHG | WEIGHT: 212 LBS | HEART RATE: 51 BPM | BODY MASS INDEX: 28.1 KG/M2 | HEIGHT: 73 IN | DIASTOLIC BLOOD PRESSURE: 70 MMHG

## 2020-10-12 PROCEDURE — 99214 OFFICE O/P EST MOD 30 MIN: CPT

## 2020-10-12 NOTE — HISTORY OF PRESENT ILLNESS
[FreeTextEntry1] : NAEEM RUGGIERO is a 74 year old male with a past medical history of \par \par Abnormal EKG\par \par *Essential hypertension. Hypertensive heart disease. On carvedilol, Amlodipine, and Valsartan-Hydrochlorothiazide.\par \par •dyslipidemia mixed : Tolerating livalo. \par \par •Coronary artery disease, nonobstructive on a CTA, per past note. Preserved LV systolic function. Stable perfusion scan, per past note. On antiplatelet agent, statin therapy and beta blockers.\par \par • thoracic aortic ectasia. Stable, remaining asymptomatic. \par \par •moderate mitral/aortic non rheumatic regurgitation. Stable LV dimension, ejection fraction, no history or signs of left or right heart failure per past note. 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 Sinus bradycardia\par \par Last 8/18/20. In the interim there have been no hospitalizations or procedures. He denies chest pain, pressure, palpitations, unusual shortness of breath, orthopnea, LE edema, lightheadedness, dizziness, near syncope or syncope. He is not on a formal exercse regimen. Nonsmoker. His only complaint is a lack of energy which has been going on for a year.\par \par  \par Cardiology Summary\par \par EKG: June 30, 2020, Sinus bradycardia normal sinus rhythm with first degree AV block (nnoted on prior EKG before ablation) with normal QRS duration and normal QT corrected. \par Echo: 12/20/17:, mildar, mild mitral regurgitation LVEF 60%. \par Cardiac Cath: 8/5/20, minimal irregularity \par Ablation: 2/16/17, SVT ablation DR. Blanc \par  \par \par \par past tests for reference:\par \par CTA of the coronary arteries in 2009 was overall nonobstructive \par \par •Carotid Doppler study 7-30-14 showed mild atherosclerosis, nonobstructive. No significant change when compared to before.\par \par  Stress myocardial perfusion scan. 10/6/2015. 7 minutes and 26 seconds of Eber protocol 10.1 METs of workload.Nonischemic myocardial perfusion scan in a limited study.\par \par labs 7-29-16 Na+ 144, K 4.2, bun/creat 19/1.09, AST 21, ALT 27, LDL 83, HDL 44, triglycerides 105, hgba1c 5.8, wbc 3.8, h/h 14.2/41.5, plat 161\par \par  ekg 11-22-16 NSR with 1av and nsstt. \par \par •echo 11-14-16 ef 60% with moderate MR, calcified trileaflet AV with normal opening, mild to moderate AR, aortic rot 3.3cm, moderate LAE, normal ventricular function, midl diastolic dysfunctioin, minimal TR, normal pulmonary pressures. \par \par Carotid Doppler study December 20, 2017. Nose significant obstructive disease.\par \par Echocardiogram dated December 28 2017. LV ejection fraction 60% mild mitral and aortic regurgitation.\par \par Labs December 2, 2017. He is stable. CBC. Sodium 144, potassium 4.6, creatinine 1.08. LFTs normal. Total cholesterol 136. LDL 70.\par \par Reviewed on July 26, 2018.\par EKG July 26, 2018. Indication palpitations, being dictation. Normal sinus rhythm. First-degree AV block. Normal intervals.\par \par Reviewed on December 19, 2018.\par Echocardiogram December 19, 2018. LV ejection fraction 55% with mitral prolapse. Mild to moderate mitral regurgitation. Ascending aorta, 4 cm. Borderline right ventricular dimensions. Mild left and right atrial enlargement. Normal pulmonary artery systolic pressure.\par \par Labs from November 10, 2018 were reviewed. HDL 43, LDL 73, triglycerides 90. LFT was normal. CBC and BMP were stable.\par \par Reviewed on July 24, 2019\par EKG. Sinus bradycardia , poor R-wave progression.\par \par Reviewed on December 31, 2019\par Labs December 26, 2019 was reviewed, which showed stable CBC, CMP, TSH, lipid panel.\par \par Echocardiogram December 31, 2019 EF 60% mitral prolapse mild-to-moderate mitral regurgitation, mild aortic regurgitation, normal left atrial size pulmonary pressures. 19. Overall, no significant new changes. P.\par \par Reviewed on July 6, 2020.\par Reviewed EKGs chest x-ray labs from emergency room at [place].\par \par Reviewed on August 18, 2020\par Labs 8/5/20: WBC 6.05, Hgb 15.3, HCT 46.3, plt 190, Na 142, K 4, Cr 1.01, Gluc 109\par Labs from August 5, 2020, coronary angiography August 5, 2020 Which showed minimal irregularities, event monitor July 6, 2020 showing nonsustained ventricular tachycardia\par Echocardiogram December 31, 2019 EF 60%. MVP. Mild to mod MR. Mild AR. Normal wall motion. Mild DD. Mild ERENDIRA. Comapred with echo 12/19/18, no significant changes noted. \par \par \par

## 2020-10-12 NOTE — ASSESSMENT
[FreeTextEntry1] : NAEEM RUGGIERO is a 74 year old M who presents today Oct 12, 2020 with the above history and the following active issues:\par \par \par #1 NSVT, mildly abnormal nuclear myocardial perfusion scan, preserved LV systolic function now with minimal irregularity of coronary arteries on left heart cardiac catheterization/coronary angiography.\par Continue lifestyle and risk factor modifications. Continue with low-dose of beta-blockers aspirin and statin therapy.\par No complications.\par Reviewed present findings. False positive nuclear marker perfusion scan. Discussed risk and benefits of continue lifestyle modification as management plan with him. Change in clinical status he will contact me.\par \par #2 Mitral valve prolapse, mild to moderate mitral regurgitation. Normal pulmonary pressure. Borderline RV dimensions. Stable thoracic aortic dimensions. Recommend surveillance monitoring.\par \par #3 essential hypertension. Hypertensive heart disease. No signs of congestive heart failure. No renal deficiency. Continue present medications. Educated patient on low salt diet, alcohol intake in moderation, regular cardiovascular exercise, and weight reduction for improved BP control. Continue to monitor BP at home and call for persistently elevated readings (>140/90). \par \par #4 hyperlipidemia tolerating medications well. Continue lifestyle modification and follow labs on a regular basis. Lab slip provided.\par \par #5 paroxysmal supraventricular tachycardia. Status post ablation of AVNRT. No symptomatic recurrence. Continue present regimen of medication.\par \par #6 thoracic aortic ectasia. Stable. No significant worsening. Continue blood pressure, heart rate control. \par He understands high risk symptoms to call 911. 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 Karthikeyan Marino

## 2021-01-11 ENCOUNTER — NON-APPOINTMENT (OUTPATIENT)
Age: 76
End: 2021-01-11

## 2021-01-14 ENCOUNTER — NON-APPOINTMENT (OUTPATIENT)
Age: 76
End: 2021-01-14

## 2021-01-18 ENCOUNTER — APPOINTMENT (OUTPATIENT)
Dept: CARDIOLOGY | Facility: CLINIC | Age: 76
End: 2021-01-18
Payer: MEDICARE

## 2021-01-18 ENCOUNTER — NON-APPOINTMENT (OUTPATIENT)
Age: 76
End: 2021-01-18

## 2021-01-18 VITALS
BODY MASS INDEX: 27.57 KG/M2 | TEMPERATURE: 98.4 F | DIASTOLIC BLOOD PRESSURE: 94 MMHG | WEIGHT: 208 LBS | HEIGHT: 73 IN | SYSTOLIC BLOOD PRESSURE: 138 MMHG | HEART RATE: 92 BPM | OXYGEN SATURATION: 98 %

## 2021-01-18 PROCEDURE — 93000 ELECTROCARDIOGRAM COMPLETE: CPT

## 2021-01-18 PROCEDURE — 99214 OFFICE O/P EST MOD 30 MIN: CPT

## 2021-01-22 ENCOUNTER — APPOINTMENT (OUTPATIENT)
Dept: ELECTROPHYSIOLOGY | Facility: CLINIC | Age: 76
End: 2021-01-22
Payer: MEDICARE

## 2021-01-22 ENCOUNTER — NON-APPOINTMENT (OUTPATIENT)
Age: 76
End: 2021-01-22

## 2021-01-22 VITALS
HEIGHT: 73 IN | OXYGEN SATURATION: 99 % | TEMPERATURE: 97.7 F | DIASTOLIC BLOOD PRESSURE: 90 MMHG | WEIGHT: 208 LBS | SYSTOLIC BLOOD PRESSURE: 148 MMHG | BODY MASS INDEX: 27.57 KG/M2 | HEART RATE: 83 BPM

## 2021-01-22 PROCEDURE — 93000 ELECTROCARDIOGRAM COMPLETE: CPT

## 2021-01-22 PROCEDURE — 99204 OFFICE O/P NEW MOD 45 MIN: CPT

## 2021-01-22 RX ORDER — METOPROLOL SUCCINATE 25 MG/1
25 TABLET, EXTENDED RELEASE ORAL
Qty: 90 | Refills: 3 | Status: DISCONTINUED | COMMUNITY
Start: 2020-07-06 | End: 2021-01-22

## 2021-01-22 RX ORDER — EPINEPHRINE 0.3 MG/.3ML
0.3 INJECTION INTRAMUSCULAR
Qty: 2 | Refills: 0 | Status: ACTIVE | COMMUNITY
Start: 2020-08-20

## 2021-01-24 NOTE — REVIEW OF SYSTEMS
[Feeling Fatigued] : feeling fatigued [Dizziness] : dizziness [Shortness Of Breath] : no shortness of breath [Dyspnea on exertion] : not dyspnea during exertion [Chest Pain] : no chest pain [Cough] : no cough [Abdominal Pain] : no abdominal pain [Easy Bleeding] : no tendency for easy bleeding

## 2021-01-24 NOTE — HISTORY OF PRESENT ILLNESS
[FreeTextEntry1] : Patient is a 75-year-old male who is seen in evaluation because of recent bradycardia/AV block noted on an event monitor.  He was previously on beta-blocker which was discontinued.\par \par He has a prior history of hypertension and also history of supraventricular tachycardia.  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  He woke up around 8:30 in the morning felt somewhat sweaty and was sitting at edge of his bed when he went out completely.  He did not come to the hospital then.  Patient was subsequently seen in the office and he had a monitor placed between 12/24/2020 to 1/20/2021: It showed that he had episodes of second-degree type I and II AV block, NSVT, longest pause 2.8 seconds.  He was previously on metoprolol and this was discontinued.  The event monitor continued after the metoprolol was discontinued between 1/10/21 and 1/20/21.  Off metoprolol he demonstrated episodes of NSVT with rates between 100 -140 bpm.  The longest ventricular tachycardia episode was 13.2 seconds.  After the beta-blocker was stopped there was  pauses 2.8 seconds on 1/16/2021.\par \par Of note the patient fell on ice back in December and sustained fracture of the right leg and is currently on crutches.

## 2021-01-24 NOTE — PHYSICAL EXAM
[General Appearance - Well Developed] : well developed [General Appearance - In No Acute Distress] : no acute distress [Normal Conjunctiva] : the conjunctiva exhibited no abnormalities [Normal Jugular Venous V Waves Present] : normal jugular venous V waves present [Heart Sounds] : normal S1 and S2 [Edema] : no peripheral edema present [Arterial Pulses Normal] : the arterial pulses were normal [Auscultation Breath Sounds / Voice Sounds] : lungs were clear to auscultation bilaterally [Abdomen Tenderness] : non-tender [Nail Clubbing] : no clubbing of the fingernails [Cyanosis, Localized] : no localized cyanosis [] : no rash [Impaired Insight] : insight and judgment were intact

## 2021-01-24 NOTE — DISCUSSION/SUMMARY
[FreeTextEntry1] : This is a 75-year-old man with a prior history of hypertension,  previous SVT status post catheter ablation in 2017 by Dr. Blanc  - slow pathway modification.  He has done well since the procedure and has not had recurrent tachycardia.  He has a first-degree AV block and also periods of type I and type II AV block with maximum pause 2.8 seconds.  He is now off beta-blockers.  He has had runs of nonsustained ventricular tachycardia at rates less than 140 bpm.  This was monomorphic.  He had a cardiac catheterization which did not show any obstructive arteries and he had an echocardiogram that showed preserved left ventricular function.  I am concerned about his syncope episode that he had about a month ago that this could have been related to significant bradycardic event because it occurred while he was sitting at the edge of his bed when he woke up.  I doubt that this was vasovagal in nature.  He is currently on beta-blockers which the patient may need because of his NSVT.\par \par My recommendation would be to put in an implantable loop monitor this patient and continue to monitor him very carefully over the next several months if on the monitor he shows recurrent AV block off the beta-blocker and pauses greater than 3 seconds I would strongly recommend we consider pacing.  If on the other hand he has recurrent NSVT and we need to resume beta-blockers he would also require pacing.\par \par Recommendation:  implantable loop monitor.

## 2021-01-31 ENCOUNTER — APPOINTMENT (OUTPATIENT)
Dept: DISASTER EMERGENCY | Facility: CLINIC | Age: 76
End: 2021-01-31

## 2021-02-01 LAB — SARS-COV-2 N GENE NPH QL NAA+PROBE: NOT DETECTED

## 2021-02-03 ENCOUNTER — APPOINTMENT (OUTPATIENT)
Dept: CARDIOLOGY | Facility: CLINIC | Age: 76
End: 2021-02-03

## 2021-02-03 ENCOUNTER — OUTPATIENT (OUTPATIENT)
Dept: OUTPATIENT SERVICES | Facility: HOSPITAL | Age: 76
LOS: 1 days | End: 2021-02-03
Payer: MEDICARE

## 2021-02-03 ENCOUNTER — NON-APPOINTMENT (OUTPATIENT)
Age: 76
End: 2021-02-03

## 2021-02-03 DIAGNOSIS — Z98.890 OTHER SPECIFIED POSTPROCEDURAL STATES: Chronic | ICD-10-CM

## 2021-02-03 DIAGNOSIS — Z90.89 ACQUIRED ABSENCE OF OTHER ORGANS: Chronic | ICD-10-CM

## 2021-02-03 DIAGNOSIS — I47.2 VENTRICULAR TACHYCARDIA: Chronic | ICD-10-CM

## 2021-02-03 PROCEDURE — 33285 INSJ SUBQ CAR RHYTHM MNTR: CPT

## 2021-02-05 ENCOUNTER — NON-APPOINTMENT (OUTPATIENT)
Age: 76
End: 2021-02-05

## 2021-02-17 ENCOUNTER — APPOINTMENT (OUTPATIENT)
Dept: CARDIOLOGY | Facility: CLINIC | Age: 76
End: 2021-02-17
Payer: MEDICARE

## 2021-02-17 VITALS
BODY MASS INDEX: 27.17 KG/M2 | WEIGHT: 205 LBS | HEIGHT: 73 IN | HEART RATE: 86 BPM | OXYGEN SATURATION: 97 % | DIASTOLIC BLOOD PRESSURE: 80 MMHG | SYSTOLIC BLOOD PRESSURE: 148 MMHG | TEMPERATURE: 98 F

## 2021-02-17 VITALS
HEIGHT: 73 IN | TEMPERATURE: 97.3 F | DIASTOLIC BLOOD PRESSURE: 90 MMHG | HEART RATE: 87 BPM | OXYGEN SATURATION: 98 % | WEIGHT: 205 LBS | SYSTOLIC BLOOD PRESSURE: 150 MMHG | BODY MASS INDEX: 27.17 KG/M2

## 2021-02-17 PROCEDURE — 93291 INTERROG DEV EVAL SCRMS IP: CPT

## 2021-02-17 PROCEDURE — 99214 OFFICE O/P EST MOD 30 MIN: CPT

## 2021-02-17 RX ORDER — PITAVASTATIN CALCIUM 4.18 MG/1
4 TABLET, FILM COATED ORAL
Refills: 0 | Status: ACTIVE | COMMUNITY

## 2021-02-17 NOTE — HISTORY OF PRESENT ILLNESS
[FreeTextEntry1] : 75 w/ PMH of HTN, HLD, SVT, abnormal EKG;  Cath revealed no significant coroanry disease. LVEF is normal.\par \par Patient perhaps had episode of vasovagal syncope without a clear trigger.  He had  a [employer] 30 day monitor.  He had episodes of bradycardia and heart block.  He was advised to cut his dose of metoprolol in half and eventually stop.  After stopping, he had an episode of NSVT. EP was consulted. ILR was placed.\par \par EP PLan is to monitor him closely. PPM will be needed if he has more than 3-second pause with out AV blocking medications. Monitor for NSVT episodes also. If he has recurrent NSVT, beta-blockers may have to be reconsidered\par \par UnControlled hypertension, asymptomatic.  His home blood pressure log also shows high blood pressure readings.\par \par He has mild shortness of breath on exertion.  He also has cough and sputum in AM.  PFT is pending.  No past history of COPD.

## 2021-02-17 NOTE — REASON FOR VISIT
[Follow-Up - Clinic] : a clinic follow-up of [Hypertension] : hypertension [Medication Management] : Medication management [Mitral Regurgitation] : mitral regurgitation [Palpitations] : palpitations [FreeTextEntry1] : Post-cath

## 2021-02-17 NOTE — PHYSICAL EXAM
[General Appearance - Well Developed] : well developed [Normal Appearance] : normal appearance [Well Groomed] : well groomed [General Appearance - Well Nourished] : well nourished [No Deformities] : no deformities [General Appearance - In No Acute Distress] : no acute distress [Normal Conjunctiva] : the conjunctiva exhibited no abnormalities [Eyelids - No Xanthelasma] : the eyelids demonstrated no xanthelasmas [Normal Oral Mucosa] : normal oral mucosa [No Oral Pallor] : no oral pallor [No Oral Cyanosis] : no oral cyanosis [Normal Jugular Venous A Waves Present] : normal jugular venous A waves present [Normal Jugular Venous V Waves Present] : normal jugular venous V waves present [No Jugular Venous Darnell A Waves] : no jugular venous darnell A waves [Respiration, Rhythm And Depth] : normal respiratory rhythm and effort [Exaggerated Use Of Accessory Muscles For Inspiration] : no accessory muscle use [Auscultation Breath Sounds / Voice Sounds] : lungs were clear to auscultation bilaterally [Heart Rate And Rhythm] : heart rate and rhythm were normal [Heart Sounds] : normal S1 and S2 [Murmurs] : no murmurs present [Arterial Pulses Normal] : the arterial pulses were normal [Edema] : no peripheral edema present [Abdomen Soft] : soft [Abdomen Tenderness] : non-tender [Abdomen Mass (___ Cm)] : no abdominal mass palpated [FreeTextEntry1] : right leg in walking boot [Nail Clubbing] : no clubbing of the fingernails [Cyanosis, Localized] : no localized cyanosis [Petechial Hemorrhages (___cm)] : no petechial hemorrhages [Skin Color & Pigmentation] : normal skin color and pigmentation [] : no rash [No Venous Stasis] : no venous stasis [Skin Lesions] : no skin lesions [No Skin Ulcers] : no skin ulcer [No Xanthoma] : no  xanthoma was observed [Oriented To Time, Place, And Person] : oriented to person, place, and time [Affect] : the affect was normal [Mood] : the mood was normal [No Anxiety] : not feeling anxious

## 2021-03-10 ENCOUNTER — NON-APPOINTMENT (OUTPATIENT)
Age: 76
End: 2021-03-10

## 2021-03-23 ENCOUNTER — APPOINTMENT (OUTPATIENT)
Dept: CARDIOLOGY | Facility: CLINIC | Age: 76
End: 2021-03-23
Payer: MEDICARE

## 2021-03-23 PROCEDURE — 93298 REM INTERROG DEV EVAL SCRMS: CPT

## 2021-03-23 PROCEDURE — G2066: CPT

## 2021-03-29 ENCOUNTER — APPOINTMENT (OUTPATIENT)
Dept: ELECTROPHYSIOLOGY | Facility: CLINIC | Age: 76
End: 2021-03-29
Payer: MEDICARE

## 2021-03-29 VITALS
WEIGHT: 208 LBS | BODY MASS INDEX: 27.57 KG/M2 | DIASTOLIC BLOOD PRESSURE: 80 MMHG | SYSTOLIC BLOOD PRESSURE: 126 MMHG | TEMPERATURE: 97 F | HEIGHT: 73 IN | OXYGEN SATURATION: 98 % | HEART RATE: 59 BPM

## 2021-03-29 PROCEDURE — 99213 OFFICE O/P EST LOW 20 MIN: CPT

## 2021-03-29 RX ORDER — ASPIRIN 81 MG
81 TABLET, DELAYED RELEASE (ENTERIC COATED) ORAL DAILY
Refills: 0 | Status: DISCONTINUED | COMMUNITY
End: 2021-03-29

## 2021-03-29 NOTE — PHYSICAL EXAM
[General Appearance - Well Developed] : well developed [General Appearance - In No Acute Distress] : no acute distress [Normal Conjunctiva] : the conjunctiva exhibited no abnormalities [Normal Jugular Venous V Waves Present] : normal jugular venous V waves present [Auscultation Breath Sounds / Voice Sounds] : lungs were clear to auscultation bilaterally [Heart Sounds] : normal S1 and S2 [Arterial Pulses Normal] : the arterial pulses were normal [Edema] : no peripheral edema present [Abdomen Tenderness] : non-tender [Nail Clubbing] : no clubbing of the fingernails [Cyanosis, Localized] : no localized cyanosis [] : no rash [Impaired Insight] : insight and judgment were intact

## 2021-04-01 ENCOUNTER — APPOINTMENT (OUTPATIENT)
Dept: CARDIOLOGY | Facility: CLINIC | Age: 76
End: 2021-04-01
Payer: MEDICARE

## 2021-04-01 ENCOUNTER — APPOINTMENT (OUTPATIENT)
Dept: CARDIOLOGY | Facility: CLINIC | Age: 76
End: 2021-04-01

## 2021-04-01 VITALS
DIASTOLIC BLOOD PRESSURE: 84 MMHG | OXYGEN SATURATION: 97 % | SYSTOLIC BLOOD PRESSURE: 126 MMHG | HEART RATE: 72 BPM | BODY MASS INDEX: 27.57 KG/M2 | WEIGHT: 208 LBS | HEIGHT: 73 IN

## 2021-04-01 DIAGNOSIS — K21.9 GASTRO-ESOPHAGEAL REFLUX DISEASE W/OUT ESOPHAGITIS: ICD-10-CM

## 2021-04-01 DIAGNOSIS — Z87.898 PERSONAL HISTORY OF OTHER SPECIFIED CONDITIONS: ICD-10-CM

## 2021-04-01 DIAGNOSIS — Z91.018 ALLERGY TO OTHER FOODS: ICD-10-CM

## 2021-04-01 PROCEDURE — 93285 PRGRMG DEV EVAL SCRMS IP: CPT

## 2021-04-01 PROCEDURE — 99215 OFFICE O/P EST HI 40 MIN: CPT

## 2021-04-01 PROCEDURE — 96374 THER/PROPH/DIAG INJ IV PUSH: CPT | Mod: 59

## 2021-04-01 PROCEDURE — 93306 TTE W/DOPPLER COMPLETE: CPT

## 2021-04-01 NOTE — PROCEDURE
[de-identified] : [employer] Loop Recorder [de-identified] : Reveal LINQ LNQ11 [de-identified] : [id number] [de-identified] : 02/03/2021 [de-identified] : Battery status good [de-identified] : No events.\par \par Patient is remotely monitored by [facility]. Next DRC 4/29/21.\par Discussed red flag symptoms, which would warrant sooner or emergent medical evaluation.\par Any questions and concerns were addressed and resolved.\par \par Sincerely,\par Muriel FLOOD\par Patient's history, testing, and plan was reviewed with supervising physician, Dr. Chi Cox

## 2021-04-01 NOTE — HISTORY OF PRESENT ILLNESS
[FreeTextEntry1] : 75 w/ PMH of HTN, HLD, SVT, abnormal EKG;  Cath revealed no significant coroanry disease in August 2020. LVEF is normal.\par \par he had episode of TIA on 3/24/2021: It lasted under half an hour.  It resolved on its own.  He had visual disturbance and blurriness.  No weakness or speech disturbance.\par He went to the ER at [place].  Neurologic work-up was negative and it included CTA of carotids and head, MRI, EKG, labs.  LDL was 84.  Etiology of TIA?  He saw neurology today morning.  He now takes full dose aspirin.\par \par Patient perhaps had episode of  syncope.  He had  a [employer] 30 day monitor.  He had episodes of bradycardia and heart block.  He was advised to cut his dose of metoprolol in half and eventually stop.  After stopping, he had an episode of NSVT. EP was consulted. ILR was placed.  The patient is awaiting PPM placement; consulted Dr. Woodruff few days ago\par \par The patient may need beta-blockers after PPM (for NSVT episode)\par \par Controlled hypertension, asymptomatic. \par \par He has mild shortness of breath on exertion.  He also has cough and sputum in AM.  PFT is pending.  No past history of COPD.

## 2021-04-01 NOTE — DISCUSSION/SUMMARY
[Patient] : the patient [___ Month(s)] : [unfilled] month(s) [With ___] : with [unfilled] [FreeTextEntry1] : 75M w/ PMH as above presenting for follow up:\par \par 1. HTN - telmisartan-HCTZ 80-12.5 mg PO daily; off of metoprolol for now. Pressure well controlled. Add Norvasc 2.5 mg p.o. daily.\par 2. HLD - on livalo; LDL 84\par 3. Continue lifestyle modifications with healthy diet and exercise.\par 4. EP -schedule PPM.  Patient may need beta-blockers after PPM as per EP decision\par 5. Watch for episodes of NSVT or significant bradycardia or sinus pauses. Management as noted above in HPI as per EP (BPM versus beta-blockers).  He is seeing EP nurse today.\par 6.  Mild dyspnea on  exertion may be COPD related?  He is awaiting PFTs.  He is being followed by Dr. Oppenheimer.  He does have cough and sputum in AM.\par 7. ILR was checked today.  No PAF.  Has had 2 episodes of TIA in the past few weeks.  Etiology?  Discussed his presentation with Dr. Oppenheimer.  It seems that the TIA diagnosis may not be accurate.  If he does have recurrent TIA, WON can be considered. (Reviewed CTA of carotids, CT a of the brain, MRI, labs, EKG: All from 3/25/2021)\par 8.  Patient had saline contrast study.  There is no evidence of PFO.\par \par \par Thank you for this referral and allowing me to participate in the care of this patient.  If I can be of any further help or  if you have any questions, please do not hesitate to contact me\par \par \par Sincerely,\par \par Chi Cox MD, [facility], [place]

## 2021-04-01 NOTE — DISCUSSION/SUMMARY
[Patient] : the patient [___ Month(s)] : [unfilled] month(s) [With ___] : with [unfilled] [FreeTextEntry1] : 75M w/ PMH as above presenting for follow up:\par \par 1. HTN - telmisartan-HCTZ 80-12.5 mg PO daily; off of metoprolol for now. Pressure well controlled. Add Norvasc 2.5 mg p.o. daily.\par 2. HLD - on livalo; LDL 84\par 3. Continue lifestyle modifications with healthy diet and exercise.\par 4. EP -schedule PPM.  Patient may need beta-blockers after PPM as per EP decision\par 5. Watch for episodes of NSVT or significant bradycardia or sinus pauses. Management as noted above in HPI as per EP (BPM versus beta-blockers).  He is seeing EP nurse today.\par 6.  Mild dyspnea on  exertion may be COPD related?  He is awaiting PFTs.  He is being followed by Dr. Oppenheimer.  He does have cough and sputum in AM.\par 7. ILR was checked today.  No PAF.  Has had 2 episodes of TIA in the past few weeks.  Etiology?  Discussed his presentation with Dr. Oppenheimer.  It seems that the TIA diagnosis may not be accurate.  If he does have recurrent TIA, WON can be considered. (Reviewed CTA of carotids, CT a of the brain, MRI, labs, EKG: All from 3/25/2021)\par 8.  Patient had saline contrast study.  There is no evidence of PFO.\par \par \par Thank you for this referral and allowing me to participate in the care of this patient.  If I can be of any further help or  if you have any questions, please do not hesitate to contact me\par \par \par Sincerely,\par \par Chi Cox MD, [facility], [place]

## 2021-04-01 NOTE — PROCEDURE
[de-identified] : [employer] Loop Recorder [de-identified] : Reveal LINQ LNQ11 [de-identified] : [id number] [de-identified] : 02/03/2021 [de-identified] : Battery status good [de-identified] : No events.\par \par Patient is remotely monitored by [facility]. Next DRC 4/29/21.\par Discussed red flag symptoms, which would warrant sooner or emergent medical evaluation.\par Any questions and concerns were addressed and resolved.\par \par Sincerely,\par Muriel FLOOD\par Patient's history, testing, and plan was reviewed with supervising physician, Dr. Chi Cox

## 2021-04-01 NOTE — PHYSICAL EXAM
[General Appearance - Well Developed] : well developed [Normal Appearance] : normal appearance [Well Groomed] : well groomed [General Appearance - Well Nourished] : well nourished [No Deformities] : no deformities [General Appearance - In No Acute Distress] : no acute distress [Normal Conjunctiva] : the conjunctiva exhibited no abnormalities [Eyelids - No Xanthelasma] : the eyelids demonstrated no xanthelasmas [Normal Oral Mucosa] : normal oral mucosa [No Oral Pallor] : no oral pallor [No Oral Cyanosis] : no oral cyanosis [Normal Jugular Venous A Waves Present] : normal jugular venous A waves present [Normal Jugular Venous V Waves Present] : normal jugular venous V waves present [No Jugular Venous Darnell A Waves] : no jugular venous darnell A waves [Respiration, Rhythm And Depth] : normal respiratory rhythm and effort [Exaggerated Use Of Accessory Muscles For Inspiration] : no accessory muscle use [Auscultation Breath Sounds / Voice Sounds] : lungs were clear to auscultation bilaterally [Heart Rate And Rhythm] : heart rate and rhythm were normal [Heart Sounds] : normal S1 and S2 [Murmurs] : no murmurs present [Arterial Pulses Normal] : the arterial pulses were normal [Edema] : no peripheral edema present [Abdomen Soft] : soft [Abdomen Tenderness] : non-tender [Abdomen Mass (___ Cm)] : no abdominal mass palpated [Nail Clubbing] : no clubbing of the fingernails [Cyanosis, Localized] : no localized cyanosis [Petechial Hemorrhages (___cm)] : no petechial hemorrhages [Skin Color & Pigmentation] : normal skin color and pigmentation [] : no rash [No Venous Stasis] : no venous stasis [Skin Lesions] : no skin lesions [No Skin Ulcers] : no skin ulcer [No Xanthoma] : no  xanthoma was observed [Oriented To Time, Place, And Person] : oriented to person, place, and time [Affect] : the affect was normal [Mood] : the mood was normal [No Anxiety] : not feeling anxious [FreeTextEntry1] : right leg in walking boot

## 2021-04-01 NOTE — REASON FOR VISIT
[Follow-up Device Check] : is here today for a follow-up device check visit for [Follow-Up - Clinic] : a clinic follow-up of [Hypertension] : hypertension [Medication Management] : Medication management [Mitral Regurgitation] : mitral regurgitation [Palpitations] : palpitations [FreeTextEntry1] : Post-cath

## 2021-04-01 NOTE — HISTORY OF PRESENT ILLNESS
[FreeTextEntry1] : 75 w/ PMH of HTN, HLD, SVT, abnormal EKG;  Cath revealed no significant coroanry disease in August 2020. LVEF is normal.\par \par he had episode of TIA on 3/24/2021: It lasted under half an hour.  It resolved on its own.  He had visual disturbance and blurriness.  No weakness or speech disturbance.\par He went to the ER at [place].  Neurologic work-up was negative and it included CTA of carotids and head, MRI, EKG, labs.  LDL was 84.  Etiology of TIA?  He saw neurology today morning.  He now takes full dose aspirin.\par \par Patient perhaps had episode of  syncope.  He had  a [employer] 30 day monitor.  He had episodes of bradycardia and heart block.  He was advised to cut his dose of metoprolol in half and eventually stop.  After stopping, he had an episode of NSVT. EP was consulted. ILR was placed.  The patient is awaiting PPM placement; consulted Dr. Woodruff few days ago\par \par The patient may need beta-blockers after PPM (for NSVT episode)\par \par Controlled hypertension, asymptomatic. \par \par He has mild shortness of breath on exertion.  He also has cough and sputum in AM.  PFT is pending.  No past history of COPD.

## 2021-04-04 NOTE — HISTORY OF PRESENT ILLNESS
[FreeTextEntry1] : Patient is a 75-year-old man who was seen in follow-up evaluation after recent TIA.  He was also noted to have a pause of 3 seconds on March 3, 2021.  He has no associated dizziness or lightheadedness or syncope.\par He has a prior history of bradycardia/AV block noted on an event monitor.  He was previously on beta-blocker which was discontinued.  He is status post ILR implant.\par \par He has a prior history of hypertension and also history of supraventricular tachycardia.  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  He woke up around 8:30 in the morning felt somewhat sweaty and was sitting at edge of his bed when he went out completely.  He did not come to the hospital then.  Patient was subsequently seen in the office and he had a monitor placed between 12/24/2020 to 1/20/2021: It showed that he had episodes of second-degree type I and II AV block, NSVT, longest pause 2.8 seconds.  He was previously on metoprolol and this was discontinued.  The event monitor continued after the metoprolol was discontinued between 1/10/21 and 1/20/21.  Off metoprolol he demonstrated episodes of NSVT with rates between 100 -140 bpm.  The longest ventricular tachycardia episode was 13.2 seconds.  After the beta-blocker was stopped there was  pauses 2.8 seconds on 1/16/2021.\par \par Of note the patient fell on ice back in December and sustained fracture of the right leg and is currently on crutches.

## 2021-04-04 NOTE — DISCUSSION/SUMMARY
[FreeTextEntry1] : Patient has had a prior history of hypertension, SVT, status post prior catheter ablation with slow pathway.  He has had a first-degree AV block peers of type I as well as 2 AV block but previous pauses up to 2.8 seconds.  He status post ILR implant and recent monitoring showed a 3-second pause on March 3 2021.  He did not have symptoms or awareness of the episode.  He has had prior NSVT and would benefit from beta-blocker.  The patient had a TIA March 24, 2021.  His aspirin was changed from 81 mg/day to 325 mg/day.  The implantable loop monitor was interrogated and there were no episodes of A. fib.  An echocardiogram with a bubble study was recommended.\par \par The patient would likely need a pacemaker because of his bradycardia/pauses.  This will be done after assessment of his echo and follow-up with neurology as well.  \par

## 2021-04-26 ENCOUNTER — APPOINTMENT (OUTPATIENT)
Dept: CARDIOLOGY | Facility: CLINIC | Age: 76
End: 2021-04-26
Payer: MEDICARE

## 2021-04-26 VITALS
SYSTOLIC BLOOD PRESSURE: 120 MMHG | HEIGHT: 73 IN | HEART RATE: 75 BPM | DIASTOLIC BLOOD PRESSURE: 80 MMHG | OXYGEN SATURATION: 97 % | WEIGHT: 203 LBS | BODY MASS INDEX: 26.9 KG/M2

## 2021-04-26 DIAGNOSIS — R00.1 BRADYCARDIA, UNSPECIFIED: ICD-10-CM

## 2021-04-26 PROCEDURE — 99214 OFFICE O/P EST MOD 30 MIN: CPT

## 2021-04-26 PROCEDURE — 93285 PRGRMG DEV EVAL SCRMS IP: CPT

## 2021-04-26 NOTE — PROCEDURE
[de-identified] : [employer] Loop Recorder [de-identified] : Reveal LINQ LNQ11 [de-identified] : [id number] [de-identified] : 02/03/2021 [de-identified] : Battery status good [de-identified] : No events.\par \par Patient is remotely monitored by [facility]. Next DRC 4/29/21.\par Discussed red flag symptoms, which would warrant sooner or emergent medical evaluation.\par Any questions and concerns were addressed and resolved.\par \par Sincerely,\par Muriel FLOOD\par Patient's history, testing, and plan was reviewed with supervising physician, Dr. Letha Contreras

## 2021-04-26 NOTE — REASON FOR VISIT
[Arrhythmia/ECG Abnorrmalities] : arrhythmia/ECG abnormalities [Follow-Up - Clinic] : a clinic follow-up of [Hypertension] : hypertension [Medication Management] : Medication management [Mitral Regurgitation] : mitral regurgitation [Palpitations] : palpitations [FreeTextEntry1] : Post-cath

## 2021-04-26 NOTE — DISCUSSION/SUMMARY
[Patient] : the patient [___ Month(s)] : in [unfilled] month(s) [FreeTextEntry1] : 75M w/ PMH as above presenting for follow up:\par \par 1. HTN - telmisartan-HCTZ 80-12.5 mg PO daily; off of metoprolol for now. Pressure well controlled. Add Norvasc 2.5 mg p.o. daily.\par 2. HLD - on livalo; LDL 84\par 3. Continue lifestyle modifications with healthy diet and exercise.\par 4. EP -schedule PPM.  Patient may need beta-blockers after PPM as per EP decision\par 5. Watch for episodes of NSVT or significant bradycardia or sinus pauses. Management as noted above in HPI as per EP (BPM versus beta-blockers).  He is seeing EP nurse today.\par 6.  Mild dyspnea on  exertion may be COPD related?  He is awaiting PFTs.  He is being followed by Dr. Oppenheimer.  He does have cough and sputum in AM.\par 7. ILR was checked today.  No PAF.  Has had 2 episodes of TIA in the past few weeks.  Etiology?  Discussed his presentation with Dr. Oppenheimer.  It seems that the TIA diagnosis may not be accurate. \par 8.  Patient had saline contrast study.  There was no evidence of PFO.\par \par Recent palpitations.  No syncope.  Loop recorder was interrogated today without any evidence of PAF or any significant arrhythmia.  Patient is scheduled for pacemaker insertion.  He needs to follow-up with EP after his echocardiogram was done.  He will follow with electrophysiology this week  for permanent pacemaker.\par \par \par

## 2021-04-26 NOTE — PHYSICAL EXAM
[Well Developed] : well developed [Well Nourished] : well nourished [No Acute Distress] : no acute distress [Normal Venous Pressure] : normal venous pressure [No Carotid Bruit] : no carotid bruit [Normal S1, S2] : normal S1, S2 [No Murmur] : no murmur [No Rub] : no rub [No Gallop] : no gallop [Clear Lung Fields] : clear lung fields [Good Air Entry] : good air entry [No Respiratory Distress] : no respiratory distress  [Soft] : abdomen soft [Non Tender] : non-tender [No Masses/organomegaly] : no masses/organomegaly [Normal Bowel Sounds] : normal bowel sounds [Normal Gait] : normal gait [No Edema] : no edema [No Cyanosis] : no cyanosis [No Clubbing] : no clubbing [No Varicosities] : no varicosities [No Rash] : no rash [No Skin Lesions] : no skin lesions [Moves all extremities] : moves all extremities [No Focal Deficits] : no focal deficits [Normal Speech] : normal speech [Alert and Oriented] : alert and oriented [Normal memory] : normal memory [General Appearance - Well Developed] : well developed [Normal Appearance] : normal appearance [Well Groomed] : well groomed [General Appearance - Well Nourished] : well nourished [No Deformities] : no deformities [General Appearance - In No Acute Distress] : no acute distress [Normal Conjunctiva] : the conjunctiva exhibited no abnormalities [Eyelids - No Xanthelasma] : the eyelids demonstrated no xanthelasmas [Normal Oral Mucosa] : normal oral mucosa [No Oral Pallor] : no oral pallor [No Oral Cyanosis] : no oral cyanosis [Normal Jugular Venous A Waves Present] : normal jugular venous A waves present [Normal Jugular Venous V Waves Present] : normal jugular venous V waves present [No Jugular Venous Darnell A Waves] : no jugular venous darnell A waves [Respiration, Rhythm And Depth] : normal respiratory rhythm and effort [Exaggerated Use Of Accessory Muscles For Inspiration] : no accessory muscle use [Auscultation Breath Sounds / Voice Sounds] : lungs were clear to auscultation bilaterally [Heart Rate And Rhythm] : heart rate and rhythm were normal [Heart Sounds] : normal S1 and S2 [Murmurs] : no murmurs present [Arterial Pulses Normal] : the arterial pulses were normal [Edema] : no peripheral edema present [Abdomen Soft] : soft [Abdomen Tenderness] : non-tender [Abdomen Mass (___ Cm)] : no abdominal mass palpated [Nail Clubbing] : no clubbing of the fingernails [Cyanosis, Localized] : no localized cyanosis [Petechial Hemorrhages (___cm)] : no petechial hemorrhages [] : no rash [Skin Color & Pigmentation] : normal skin color and pigmentation [No Venous Stasis] : no venous stasis [Skin Lesions] : no skin lesions [No Skin Ulcers] : no skin ulcer [No Xanthoma] : no  xanthoma was observed [Oriented To Time, Place, And Person] : oriented to person, place, and time [Affect] : the affect was normal [Mood] : the mood was normal [No Anxiety] : not feeling anxious [FreeTextEntry1] : right leg in walking boot

## 2021-04-26 NOTE — HISTORY OF PRESENT ILLNESS
[FreeTextEntry1] : 75 w/ PMH of HTN, HLD, SVT, abnormal EKG;  Cath revealed no significant coroanry disease in August 2020. LVEF is normal.\par \par he had episode of TIA on 3/24/2021: It lasted under half an hour.  It resolved on its own.  He had visual disturbance and blurriness.  No weakness or speech disturbance.\par He went to the ER at [place].  Neurologic work-up was negative and it included CTA of carotids and head, MRI, EKG, labs.  LDL was 84.  Etiology of TIA?  He saw neurology today morning.  He now takes full dose aspirin.\par \par Patient perhaps had episode of  syncope.  He had  a [employer] 30 day monitor.  He had episodes of bradycardia and heart block.  He was advised to cut his dose of metoprolol in half and eventually stop.  After stopping, he had an episode of NSVT. EP was consulted. ILR was placed.  The patient is awaiting PPM placement; consulted Dr. Woodruff few days ago\par \par The patient may need beta-blockers after PPM (for NSVT episode)\par \par Controlled hypertension, asymptomatic. \par \par He has mild shortness of breath on exertion.  He also has cough and sputum in AM.  PFT is pending.  No past history of COPD.

## 2021-04-29 ENCOUNTER — APPOINTMENT (OUTPATIENT)
Dept: CARDIOLOGY | Facility: CLINIC | Age: 76
End: 2021-04-29
Payer: MEDICARE

## 2021-04-29 ENCOUNTER — NON-APPOINTMENT (OUTPATIENT)
Age: 76
End: 2021-04-29

## 2021-04-29 PROCEDURE — G2066: CPT

## 2021-04-29 PROCEDURE — 93298 REM INTERROG DEV EVAL SCRMS: CPT

## 2021-05-12 ENCOUNTER — NON-APPOINTMENT (OUTPATIENT)
Age: 76
End: 2021-05-12

## 2021-05-13 ENCOUNTER — OUTPATIENT (OUTPATIENT)
Dept: OUTPATIENT SERVICES | Facility: HOSPITAL | Age: 76
LOS: 1 days | End: 2021-05-13
Payer: MEDICARE

## 2021-05-13 DIAGNOSIS — Z98.890 OTHER SPECIFIED POSTPROCEDURAL STATES: Chronic | ICD-10-CM

## 2021-05-13 DIAGNOSIS — I47.2 VENTRICULAR TACHYCARDIA: Chronic | ICD-10-CM

## 2021-05-13 DIAGNOSIS — Z90.89 ACQUIRED ABSENCE OF OTHER ORGANS: Chronic | ICD-10-CM

## 2021-05-13 PROCEDURE — 71046 X-RAY EXAM CHEST 2 VIEWS: CPT | Mod: 26

## 2021-05-16 ENCOUNTER — APPOINTMENT (OUTPATIENT)
Dept: DISASTER EMERGENCY | Facility: CLINIC | Age: 76
End: 2021-05-16

## 2021-05-17 LAB — SARS-COV-2 N GENE NPH QL NAA+PROBE: NOT DETECTED

## 2021-05-19 ENCOUNTER — OUTPATIENT (OUTPATIENT)
Dept: INPATIENT UNIT | Facility: HOSPITAL | Age: 76
LOS: 1 days | End: 2021-05-19
Payer: MEDICARE

## 2021-05-19 DIAGNOSIS — Z90.89 ACQUIRED ABSENCE OF OTHER ORGANS: Chronic | ICD-10-CM

## 2021-05-19 DIAGNOSIS — Z98.890 OTHER SPECIFIED POSTPROCEDURAL STATES: Chronic | ICD-10-CM

## 2021-05-19 DIAGNOSIS — I47.2 VENTRICULAR TACHYCARDIA: Chronic | ICD-10-CM

## 2021-05-19 PROCEDURE — 93010 ELECTROCARDIOGRAM REPORT: CPT | Mod: 76

## 2021-05-19 PROCEDURE — 33208 INSRT HEART PM ATRIAL & VENT: CPT | Mod: KX

## 2021-05-19 PROCEDURE — 33286 RMVL SUBQ CAR RHYTHM MNTR: CPT

## 2021-05-20 ENCOUNTER — RESULT REVIEW (OUTPATIENT)
Age: 76
End: 2021-05-20

## 2021-05-20 PROCEDURE — 71045 X-RAY EXAM CHEST 1 VIEW: CPT | Mod: 26

## 2021-05-20 PROCEDURE — 99231 SBSQ HOSP IP/OBS SF/LOW 25: CPT

## 2021-05-20 PROCEDURE — 93010 ELECTROCARDIOGRAM REPORT: CPT

## 2021-05-27 ENCOUNTER — APPOINTMENT (OUTPATIENT)
Dept: CARDIOLOGY | Facility: CLINIC | Age: 76
End: 2021-05-27
Payer: MEDICARE

## 2021-05-27 VITALS
WEIGHT: 198 LBS | TEMPERATURE: 97.8 F | HEART RATE: 80 BPM | DIASTOLIC BLOOD PRESSURE: 70 MMHG | OXYGEN SATURATION: 97 % | BODY MASS INDEX: 26.24 KG/M2 | HEIGHT: 73 IN | SYSTOLIC BLOOD PRESSURE: 118 MMHG

## 2021-05-27 PROCEDURE — 99024 POSTOP FOLLOW-UP VISIT: CPT

## 2021-05-27 PROCEDURE — 93280 PM DEVICE PROGR EVAL DUAL: CPT

## 2021-05-28 ENCOUNTER — NON-APPOINTMENT (OUTPATIENT)
Age: 76
End: 2021-05-28

## 2021-06-07 ENCOUNTER — NON-APPOINTMENT (OUTPATIENT)
Age: 76
End: 2021-06-07

## 2021-06-11 ENCOUNTER — APPOINTMENT (OUTPATIENT)
Dept: CARDIOLOGY | Facility: CLINIC | Age: 76
End: 2021-06-11
Payer: MEDICARE

## 2021-06-11 VITALS
OXYGEN SATURATION: 97 % | SYSTOLIC BLOOD PRESSURE: 118 MMHG | HEART RATE: 70 BPM | BODY MASS INDEX: 26.77 KG/M2 | HEIGHT: 73 IN | DIASTOLIC BLOOD PRESSURE: 70 MMHG | WEIGHT: 202 LBS

## 2021-06-11 DIAGNOSIS — Z01.818 ENCOUNTER FOR OTHER PREPROCEDURAL EXAMINATION: ICD-10-CM

## 2021-06-11 PROCEDURE — 99214 OFFICE O/P EST MOD 30 MIN: CPT

## 2021-06-11 RX ORDER — OXYCODONE AND ACETAMINOPHEN 5; 325 MG/1; MG/1
5-325 TABLET ORAL
Qty: 20 | Refills: 0 | Status: DISCONTINUED | COMMUNITY
Start: 2021-05-20 | End: 2021-06-11

## 2021-06-11 NOTE — REVIEW OF SYSTEMS
[Palpitations] : palpitations [Dizziness] : dizziness [Negative] : Heme/Lymph [SOB] : no shortness of breath [Dyspnea on exertion] : not dyspnea during exertion [Chest Discomfort] : no chest discomfort [Lower Ext Edema] : no extremity edema [Leg Claudication] : no intermittent leg claudication [Orthopnea] : no orthopnea [PND] : no PND [Syncope] : no syncope [Tremor] : no tremor was seen [Numbness (Hypoesthesia)] : no numbness [Convulsions] : no convulsions [Tingling (Paresthesia)] : no tingling [Weakness] : no weakness [Limb Weakness (Paresis)] : no limb weakness (Paresis) [Speech Disturbance] : no speech disturbance

## 2021-06-11 NOTE — DISCUSSION/SUMMARY
[FreeTextEntry1] : 75-year-old gentleman with above medical history and active medical problems as noted below \par #1  Dual-chamber permanent pacemaker.  Intermittent palpitation.  Occasional cough associated with.  Postural dizziness.  No orthostasis.  Lower blood pressure.\par Last evaluation did not reveal pacemaker related arrhythmias.  Or pacemaker syndrome.  Though cannot rule out occasional PMT.\par In presence of postural dizziness and lower blood pressure will decrease amlodipine to 2.5 mg.\par Metoprolol 25 mg added in the past he could not tolerate it though at that time he had baseline bradycardia.\par If no improvement further evaluation with pacemaker interrogation and adjustment.\par If any worsening he will contact us or call 911.\par Reviewed pathophysiology with him.  Try to reassure him.  Prescriptions done.\par #2 Mitral valve prolapse, mild to moderate mitral regurgitation. Normal pulmonary pressure. Borderline RV dimensions. Stable thoracic aortic dimensions.\par #3  essential hypertension. Hypertensive heart disease. No signs of congestive heart failure. No renal deficiency. Continue present medications.\par #4 hyperlipidemia  tolerating medications well. Continue lifestyle modification and follow  labs on a regular basis.\par #5 paroxysmal supraventricular tachycardia. Status post ablation of AVNRT. No symptomatic recurrence. Continue present regimen of medication.\par #6 thoracic aortic ectasia. Stable. No significant worsening. Continue blood pressure, heart rate control. \par He understands high risk symptoms to call 911. 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 Active lifestyle and regular. Exercise is along with weight maintenance is advised.\par All the above were at length reviewed. Answered all the questions. Thank you very much for this kind referral. Please do not hesitate to give me a call for any question.\par Part of this transcription was done with voice recognition software and phonetically similar errors are common. I apologize for that. Please donot hesitate to call for any questions due to above.\par \par Follow-up as advised

## 2021-06-11 NOTE — ASSESSMENT
[FreeTextEntry1] : past tests for reference:\par \par CTA of the coronary arteries in 2009 was overall nonobstructive \par \par •Carotid Doppler study 7-30-14 showed mild atherosclerosis, nonobstructive. No significant change when compared to before.\par  Stress myocardial perfusion scan. 10/6/2015. 7 minutes and 26 seconds of Eber protocol 10.1 METs of workload.Nonischemic myocardial perfusion scan in a limited study.\par labs 7-29-16 Na+ 144, K 4.2, bun/creat 19/1.09, AST 21, ALT 27, LDL 83, HDL 44, triglycerides 105, hgba1c 5.8, wbc 3.8, h/h 14.2/41.5, plat 161\par  ekg 11-22-16 NSR with 1av and nsstt. \par •echo 11-14-16 ef 60% with moderate MR, calcified trileaflet AV with normal opening, mild to moderate AR, aortic rot 3.3cm, moderate LAE, normal ventricular function, midl diastolic dysfunctioin, minimal TR, normal pulmonary pressures. \par Carotid Doppler study December 20, 2017. Nose significant obstructive disease.\par Echocardiogram dated December 28 2017. LV ejection fraction 60% mild mitral and aortic regurgitation.\par Labs December 2, 2017. He is stable. CBC. Sodium 144, potassium 4.6, creatinine 1.08. LFTs normal. Total cholesterol 136.  LDL 70.\par \par Reviewed on July 26, 2018.\par EKG ordered and inspected by me. July 26, 2018. Indication palpitations, being dictation. Normal sinus rhythm. First-degree AV block. Normal intervals.\par \par Reviewed on December 19, 2018.\par Echocardiogram December 19, 2018. LV ejection fraction 55% with mitral prolapse. Mild to moderate mitral regurgitation. Ascending aorta, 4 cm. Borderline right ventricular dimensions. Mild left and right atrial enlargement. Normal pulmonary artery systolic pressure.\par Labs from November 10, 2018 were reviewed. HDL 43, LDL 73, triglycerides 90. LFT was normal. CBC and BMP were stable.\par \par Reviewed on July 24, 2019\par EKG. Sinus bradycardia , poor R-wave progression.\par \par Reviewed on December 31, 2019\par Labs December 26, 2019 was reviewed, which showed stable CBC, CMP, TSH, lipid panel.\par Echocardiogram December 31, 2019 EF 60% mitral prolapse mild-to-moderate mitral regurgitation, mild aortic regurgitation, normal left atrial size pulmonary pressures. 19. Overall, no significant new changes. P.\par \par Reviewed on July 6, 2020.\par Reviewed EKGs chest x-ray labs from emergency room at [place].\par \par Reviewed on August 18, 2020\par Labs from August 5, 2020, coronary angiography August 5, 2020 Which showed minimal irregularities, event monitor July 6, 2020 showing nonsustained ventricular tachycardia\par Echocardiogram December 31, 2019 were all reviewed.

## 2021-06-11 NOTE — HISTORY OF PRESENT ILLNESS
[FreeTextEntry1] : Other active medical problems as noted\par \par *Essential hypertension. Hypertensive heart disease. On carvedilol, Amlodipine, and Valsartan-Hydrochlorothiazide.\par \par •dyslipidemia mixed : Tolerating livalo. \par \par •Coronary artery disease, nonobstructive on a CTA, per past note. Preserved LV systolic function.  Stable perfusion scan, per past note.  On antiplatelet agent, statin therapy and beta blockers.\par \par • thoracic aortic ectasia. Stable, remaining asymptomatic. \par \par •moderate mitral/aortic non rheumatic  regurgitation. Stable LV dimension, ejection fraction, no history or signs of left or right heart failure per past note. 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  Sinus bradycardia s/p ILR status post permanent pacemaker implantation May 17, 2021\par

## 2021-06-11 NOTE — PHYSICAL EXAM
[General Appearance - Well Developed] : well developed [Normal Appearance] : normal appearance [Well Groomed] : well groomed [General Appearance - Well Nourished] : well nourished [No Deformities] : no deformities [General Appearance - In No Acute Distress] : no acute distress [Normal Conjunctiva] : the conjunctiva exhibited no abnormalities [Eyelids - No Xanthelasma] : the eyelids demonstrated no xanthelasmas [No Oral Pallor] : no oral pallor [] : no respiratory distress [Respiration, Rhythm And Depth] : normal respiratory rhythm and effort [Exaggerated Use Of Accessory Muscles For Inspiration] : no accessory muscle use [Auscultation Breath Sounds / Voice Sounds] : lungs were clear to auscultation bilaterally [Heart Rate And Rhythm] : heart rate and rhythm were normal [Heart Sounds] : normal S1 and S2 [Arterial Pulses Normal] : the arterial pulses were normal [Edema] : no peripheral edema present [Veins - Varicosity Changes] : no varicosital changes were noted in the lower extremities [Abdomen Soft] : soft [Abnormal Walk] : normal gait [Gait - Sufficient For Exercise Testing] : the gait was sufficient for exercise testing [Nail Clubbing] : no clubbing of the fingernails [Cyanosis, Localized] : no localized cyanosis [Skin Color & Pigmentation] : normal skin color and pigmentation [Oriented To Time, Place, And Person] : oriented to person, place, and time [Affect] : the affect was normal [Mood] : the mood was normal [No Anxiety] : not feeling anxious [FreeTextEntry1] : 1-2/6 lsb sm, no gallop/rub/heave or click, pacemaker site stable

## 2021-06-11 NOTE — REASON FOR VISIT
[Symptom and Test Evaluation] : symptom and test evaluation [Arrhythmia/ECG Abnorrmalities] : arrhythmia/ECG abnormalities [Hypertension] : hypertension [FreeTextEntry3] : Dr. Ram [FreeTextEntry1] : 75-year-old comes in for follow-up consultation.  Recent permanent pacemaker implantation.  Complain of intermittent fast heart rate.  Sometimes makes him cough.  Last for few seconds.  3-5 times a day.  Not associate with chest pain shortness of breath.  Intermittent mild dizziness but it is postural.  No near syncopal or syncopal event.  No other changes in medication.  Slightly anxious.\par No pacemaker site problems.

## 2021-06-28 ENCOUNTER — APPOINTMENT (OUTPATIENT)
Dept: CARDIOLOGY | Facility: CLINIC | Age: 76
End: 2021-06-28
Payer: MEDICARE

## 2021-06-28 VITALS
BODY MASS INDEX: 26.64 KG/M2 | HEIGHT: 73 IN | OXYGEN SATURATION: 95 % | SYSTOLIC BLOOD PRESSURE: 128 MMHG | WEIGHT: 201 LBS | HEART RATE: 73 BPM | DIASTOLIC BLOOD PRESSURE: 68 MMHG

## 2021-06-28 PROCEDURE — 93280 PM DEVICE PROGR EVAL DUAL: CPT

## 2021-06-28 PROCEDURE — 99214 OFFICE O/P EST MOD 30 MIN: CPT

## 2021-06-28 NOTE — DISCUSSION/SUMMARY
[FreeTextEntry1] : 75-year-old gentleman with above medical history and active medical problems as noted below \par #1  Dual-chamber permanent pacemaker.  Intermittent palpitation.  Occasional cough associated with.  Postural dizziness.  No orthostasis.  Lower blood pressure.\par No significant pacemaker related symptoms.  No diaphragmatic stimulation.  No symptoms with a or V pacing at a faster heart rate.\par We will continue present medication.  Though I would like to have 48-hour Holter monitor to evaluate his symptoms and relationship with arrhythmias or other rhythm disorder.  Meanwhile continue increasing fluid intake avoid quick changes in position.\par Reviewed pathophysiology with him.  Try to reassure him.  We will follow him in 2 weeks.  May need to discontinue amlodipine and increase dose of metoprolol or if there are episodes of SVT discussed with EP regarding invasive management.\par #2 Mitral valve prolapse, mild to moderate mitral regurgitation. Normal pulmonary pressure. Borderline RV dimensions. Stable thoracic aortic dimensions.\par #3  essential hypertension. Hypertensive heart disease. No signs of congestive heart failure. No renal deficiency. Continue present medications.\par #4 hyperlipidemia  tolerating medications well. Continue lifestyle modification and follow  labs on a regular basis.\par #5 paroxysmal supraventricular tachycardia. Status post ablation of AVNRT. No symptomatic recurrence. Continue present regimen of medication.  Evaluation is in progress.\par #6 thoracic aortic ectasia. Stable. No significant worsening. Continue blood pressure, heart rate control. \par He understands high risk symptoms to call 911. 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 Active lifestyle and regular. Exercise is along with weight maintenance is advised.\par All the above were at length reviewed. Answered all the questions. Thank you very much for this kind referral. Please do not hesitate to give me a call for any question.\par Part of this transcription was done with voice recognition software and phonetically similar errors are common. I apologize for that. Please donot hesitate to call for any questions due to above.\par \par Follow-up as advised

## 2021-06-28 NOTE — ASSESSMENT
[FreeTextEntry1] : past tests for reference:\par \par CTA of the coronary arteries in 2009 was overall nonobstructive \par \par •Carotid Doppler study 7-30-14 showed mild atherosclerosis, nonobstructive. No significant change when compared to before.\par  Stress myocardial perfusion scan. 10/6/2015. 7 minutes and 26 seconds of Eber protocol 10.1 METs of workload.Nonischemic myocardial perfusion scan in a limited study.\par labs 7-29-16 Na+ 144, K 4.2, bun/creat 19/1.09, AST 21, ALT 27, LDL 83, HDL 44, triglycerides 105, hgba1c 5.8, wbc 3.8, h/h 14.2/41.5, plat 161\par  ekg 11-22-16 NSR with 1av and nsstt. \par •echo 11-14-16 ef 60% with moderate MR, calcified trileaflet AV with normal opening, mild to moderate AR, aortic rot 3.3cm, moderate LAE, normal ventricular function, midl diastolic dysfunctioin, minimal TR, normal pulmonary pressures. \par Carotid Doppler study December 20, 2017. Nose significant obstructive disease.\par Echocardiogram dated December 28 2017. LV ejection fraction 60% mild mitral and aortic regurgitation.\par Labs December 2, 2017. He is stable. CBC. Sodium 144, potassium 4.6, creatinine 1.08. LFTs normal. Total cholesterol 136.  LDL 70.\par \par Reviewed on July 26, 2018.\par EKG ordered and inspected by me. July 26, 2018. Indication palpitations, being dictation. Normal sinus rhythm. First-degree AV block. Normal intervals.\par \par Reviewed on December 19, 2018.\par Echocardiogram December 19, 2018. LV ejection fraction 55% with mitral prolapse. Mild to moderate mitral regurgitation. Ascending aorta, 4 cm. Borderline right ventricular dimensions. Mild left and right atrial enlargement. Normal pulmonary artery systolic pressure.\par Labs from November 10, 2018 were reviewed. HDL 43, LDL 73, triglycerides 90. LFT was normal. CBC and BMP were stable.\par \par Reviewed on July 24, 2019\par EKG. Sinus bradycardia , poor R-wave progression.\par \par Reviewed on December 31, 2019\par Labs December 26, 2019 was reviewed, which showed stable CBC, CMP, TSH, lipid panel.\par Echocardiogram December 31, 2019 EF 60% mitral prolapse mild-to-moderate mitral regurgitation, mild aortic regurgitation, normal left atrial size pulmonary pressures. 19. Overall, no significant new changes. P.\par \par Reviewed on July 6, 2020.\par Reviewed EKGs chest x-ray labs from emergency room at [facility].\par \par Reviewed on August 18, 2020\par Labs from August 5, 2020, coronary angiography August 5, 2020 Which showed minimal irregularities, event monitor July 6, 2020 showing nonsustained ventricular tachycardia\par Echocardiogram December 31, 2019 were all reviewed.\par \par June 28, 2021.  Medtronic dual-chamber pacemaker was interrogated.  A and V pacing did not induce his symptoms.  No diaphragmatic stimulation noted.  There were no aberrant tachyarrhythmias.

## 2021-06-28 NOTE — REASON FOR VISIT
[Symptom and Test Evaluation] : symptom and test evaluation [Arrhythmia/ECG Abnorrmalities] : arrhythmia/ECG abnormalities [Hypertension] : hypertension [FreeTextEntry3] : Dr. Ram [FreeTextEntry1] : 75-year-old comes in for follow-up consultation.  Recent permanent pacemaker implantation.  Complain of intermittent fast heart rate.  Continues in spite of adding metoprolol and decreasing dose of amlodipine.  Sometimes makes him cough.  Last for few seconds.  3-5 times a day.  Not associate with chest pain shortness of breath.  Intermittent mild dizziness but it is postural.  No near syncopal or syncopal event.  No other changes in medication.  Slightly anxious.\par No pacemaker site problems.

## 2021-06-28 NOTE — PROCEDURE
[Pacemaker] : pacemaker [DDDR] : DDDR [Lead Imp:  ___ohms] : lead impedance was [unfilled] ohms [Sensing Amplitude ___mv] : sensing amplitude was [unfilled] mv [___V @] : [unfilled] V [___ ms] : [unfilled] ms [de-identified] : Medtronic [de-identified] : Jeana ARGUELLO DR MRI [de-identified] : [id number] [de-identified] : 5/19/2021 [de-identified] :  [de-identified] : 10.5 years  [de-identified] : Apaced 65.9%\par Vpaced 75.3%\par \par Settings \par Atrial \par Sensing 0.3mv\par Amplitude 3.5\par Duration 0.4ms\par \par Ventricle\par Sensing 0.9mv\par Amplitude 3.5v\par Duration 0.4ms\par \par No new recorded events. \par Upper track decreased to 120bpm \par Office visit with Dr. Marino today\par \par Sincerely,\par \par Radha Hudson PA-C\par Patients history, testing and plan reviewed with supervising MD: Dr. Karthikeyan Marino

## 2021-07-02 PROCEDURE — 93224 XTRNL ECG REC UP TO 48 HRS: CPT

## 2021-07-15 ENCOUNTER — APPOINTMENT (OUTPATIENT)
Dept: CARDIOLOGY | Facility: CLINIC | Age: 76
End: 2021-07-15
Payer: MEDICARE

## 2021-07-15 VITALS
SYSTOLIC BLOOD PRESSURE: 114 MMHG | HEART RATE: 101 BPM | BODY MASS INDEX: 27.17 KG/M2 | WEIGHT: 205 LBS | DIASTOLIC BLOOD PRESSURE: 68 MMHG | OXYGEN SATURATION: 95 % | HEIGHT: 73 IN

## 2021-07-15 DIAGNOSIS — R53.83 OTHER MALAISE: ICD-10-CM

## 2021-07-15 DIAGNOSIS — R53.81 OTHER MALAISE: ICD-10-CM

## 2021-07-15 DIAGNOSIS — R00.2 PALPITATIONS: ICD-10-CM

## 2021-07-15 PROCEDURE — 99214 OFFICE O/P EST MOD 30 MIN: CPT

## 2021-07-15 NOTE — REVIEW OF SYSTEMS
[Palpitations] : palpitations [Dizziness] : dizziness [Negative] : Heme/Lymph [SOB] : no shortness of breath [Dyspnea on exertion] : not dyspnea during exertion [Chest Discomfort] : no chest discomfort [Lower Ext Edema] : no extremity edema [Leg Claudication] : no intermittent leg claudication [Orthopnea] : no orthopnea [PND] : no PND [Syncope] : no syncope [Cough] : cough [Wheezing] : no wheezing [Coughing Up Blood] : no hemoptysis [Snoring] : no snoring [Tremor] : no tremor was seen [Numbness (Hypoesthesia)] : no numbness [Convulsions] : no convulsions [Tingling (Paresthesia)] : no tingling [Weakness] : no weakness [Limb Weakness (Paresis)] : no limb weakness (Paresis) [Speech Disturbance] : no speech disturbance

## 2021-07-15 NOTE — DISCUSSION/SUMMARY
[FreeTextEntry1] : 75-year-old gentleman with above medical history and active medical problems as noted below \par #1  Dual-chamber permanent pacemaker.  Intermittent palpitation.  Occasional cough associated with.  Postural dizziness.  No orthostasis.  Stable blood pressure intermittent.  Short episodes of atrial tachycardia's.\par No significant pacemaker related symptoms.  No diaphragmatic stimulation.  No symptoms with A or V pacing at a faster heart rate.\par Discontinue amlodipine at present.  He will follow his blood pressure at home.  We will try to keep it less than 140/90 preferably less than 130/80 in presence of thoracic aortic ectasia.  If continued intermittent palpitation metoprolol needs to be increased because of underlying atrial arrhythmias.\par #2 Mitral valve prolapse, mild to moderate mitral regurgitation. Normal pulmonary pressure. Borderline RV dimensions. Stable thoracic aortic dimensions.\par #3  essential hypertension. Hypertensive heart disease. No signs of congestive heart failure. No renal deficiency.  Medication change as noted above\par #4 hyperlipidemia  tolerating medications well. Continue lifestyle modification and follow  labs on a regular basis.\par #5 paroxysmal supraventricular tachycardia. Status post ablation of AVNRT. No symptomatic recurrence.  Paroxysmal atrial tachycardia.  Short episodes.  On metoprolol.  If more symptoms increase dose.\par #6 thoracic aortic ectasia. Stable. No significant worsening. Continue blood pressure, heart rate control. \par He understands high risk symptoms to call 911. 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 Active lifestyle and regular. Exercise is along with weight maintenance is advised.\par All the above were at length reviewed. Answered all the questions. Thank you very much for this kind referral. Please do not hesitate to give me a call for any question.\par Part of this transcription was done with voice recognition software and phonetically similar errors are common. I apologize for that. Please donot hesitate to call for any questions due to above.\par \par Follow-up as advised

## 2021-07-15 NOTE — REASON FOR VISIT
[Symptom and Test Evaluation] : symptom and test evaluation [Arrhythmia/ECG Abnorrmalities] : arrhythmia/ECG abnormalities [Hypertension] : hypertension [FreeTextEntry3] : Dr. Oppenheimer [FreeTextEntry1] : 75-year-old comes in for follow-up consultation.  Recent permanent pacemaker implantation.  Complain of postural dizziness. .  No significant increase in faster heart rate.  Sometimes makes him cough.  Last for few seconds.  3-5 times a day.  Not associate with chest pain shortness of breath.  Intermittent mild dizziness but it is postural.  No near syncopal or syncopal event.  No other changes in medication.  Slightly anxious.\par No pacemaker site problems.\par I have reviewed his blood pressure readings at home and Holter monitor.

## 2021-07-15 NOTE — ASSESSMENT
[FreeTextEntry1] : past tests for reference:\par \par CTA of the coronary arteries in 2009 was overall nonobstructive \par \par •Carotid Doppler study 7-30-14 showed mild atherosclerosis, nonobstructive. No significant change when compared to before.\par  Stress myocardial perfusion scan. 10/6/2015. 7 minutes and 26 seconds of Eber protocol 10.1 METs of workload.Nonischemic myocardial perfusion scan in a limited study.\par labs 7-29-16 Na+ 144, K 4.2, bun/creat 19/1.09, AST 21, ALT 27, LDL 83, HDL 44, triglycerides 105, hgba1c 5.8, wbc 3.8, h/h 14.2/41.5, plat 161\par  ekg 11-22-16 NSR with 1av and nsstt. \par •echo 11-14-16 ef 60% with moderate MR, calcified trileaflet AV with normal opening, mild to moderate AR, aortic rot 3.3cm, moderate LAE, normal ventricular function, midl diastolic dysfunctioin, minimal TR, normal pulmonary pressures. \par Carotid Doppler study December 20, 2017. Nose significant obstructive disease.\par Echocardiogram dated December 28 2017. LV ejection fraction 60% mild mitral and aortic regurgitation.\par Labs December 2, 2017. He is stable. CBC. Sodium 144, potassium 4.6, creatinine 1.08. LFTs normal. Total cholesterol 136.  LDL 70.\par \par Reviewed on July 26, 2018.\par EKG ordered and inspected by me. July 26, 2018. Indication palpitations, being dictation. Normal sinus rhythm. First-degree AV block. Normal intervals.\par \par Reviewed on December 19, 2018.\par Echocardiogram December 19, 2018. LV ejection fraction 55% with mitral prolapse. Mild to moderate mitral regurgitation. Ascending aorta, 4 cm. Borderline right ventricular dimensions. Mild left and right atrial enlargement. Normal pulmonary artery systolic pressure.\par Labs from November 10, 2018 were reviewed. HDL 43, LDL 73, triglycerides 90. LFT was normal. CBC and BMP were stable.\par \par Reviewed on July 24, 2019\par EKG. Sinus bradycardia , poor R-wave progression.\par \par Reviewed on December 31, 2019\par Labs December 26, 2019 was reviewed, which showed stable CBC, CMP, TSH, lipid panel.\par Echocardiogram December 31, 2019 EF 60% mitral prolapse mild-to-moderate mitral regurgitation, mild aortic regurgitation, normal left atrial size pulmonary pressures. 19. Overall, no significant new changes. P.\par \par Reviewed on July 6, 2020.\par Reviewed EKGs chest x-ray labs from emergency room at [place].\par \par Reviewed on August 18, 2020\par Labs from August 5, 2020, coronary angiography August 5, 2020 Which showed minimal irregularities, event monitor July 6, 2020 showing nonsustained ventricular tachycardia\par Echocardiogram December 31, 2019 were all reviewed.\par \par June 28, 2021.  Medtronic dual-chamber pacemaker was interrogated.  A and V pacing did not induce his symptoms.  No diaphragmatic stimulation noted.  There were no aberrant tachyarrhythmias.\par \par

## 2021-07-15 NOTE — CARDIOLOGY SUMMARY
[LVEF ___%] : LVEF [unfilled]% [Mild] : mild mitral regurgitation [___] : [unfilled] [de-identified] : June 28, 2021.  Holter monitor 24 hours.  Normal sinus rhythm.  Intermittent paced beats.  Premature atrial beats.  Occasional sequential up to 6 beats.  Few seconds to about 9 minutes of atrial tachycardia.  Premature ventricular beats were rare.  Though his symptoms corresponded to normal sinus rhythm.\par \par Blood pressure readings at home are consistently overall less than 140/90.  Closer to less than 130/80 in majority of the occasions.

## 2021-07-27 ENCOUNTER — NON-APPOINTMENT (OUTPATIENT)
Age: 76
End: 2021-07-27

## 2021-08-18 ENCOUNTER — APPOINTMENT (OUTPATIENT)
Dept: CARDIOLOGY | Facility: CLINIC | Age: 76
End: 2021-08-18

## 2021-08-23 NOTE — DISCUSSION/SUMMARY
[Patient] : the patient [___ Month(s)] : [unfilled] month(s) [With ___] : with [unfilled] [FreeTextEntry1] : 75M w/ PMH as above presenting for follow up:\par \par 1. HTN - telmisartan-HCTZ 80-12.5 mg PO daily; off of metoprolol for now. Pressure not well controlled. Add Norvasc 2.5 mg p.o. daily.\par 2. HLD - on livalo\par 3. Continue lifestyle modifications with healthy diet and exercise.\par 4. EP follow up\par 5. Watch for episodes of NSVT or significant bradycardia or sinus pauses. Management as noted above in HPI as per EP (BPM versus beta-blockers).  He is seeing EP nurse today.\par 6.  Mild dyspnea on  exertion may be COPD related?  He is awaiting PFTs.  He is being followed by Dr. Oppenheimer.  He does have cough and sputum in AM.\par \par \par Thank you for this referral and allowing me to participate in the care of this patient.  If I can be of any further help or  if you have any questions, please do not hesitate to contact me\par \par \par Sincerely,\par \par Chi Cox MD, [facility], BHARATHI Complex Repair And Dorsal Nasal Flap Text: The defect edges were debeveled with a #15 scalpel blade.  The primary defect was closed partially with a complex linear closure.  Given the location of the remaining defect, shape of the defect and the proximity to free margins a dorsal nasal flap was deemed most appropriate for complete closure of the defect.  Using a sterile surgical marker, an appropriate flap was drawn incorporating the defect and placing the expected incisions within the relaxed skin tension lines where possible.    The area thus outlined was incised deep to adipose tissue with a #15 scalpel blade.  The skin margins were undermined to an appropriate distance in all directions utilizing iris scissors.

## 2021-08-26 ENCOUNTER — NON-APPOINTMENT (OUTPATIENT)
Age: 76
End: 2021-08-26

## 2021-08-26 ENCOUNTER — APPOINTMENT (OUTPATIENT)
Dept: CARDIOLOGY | Facility: CLINIC | Age: 76
End: 2021-08-26
Payer: MEDICARE

## 2021-08-26 PROCEDURE — 93296 REM INTERROG EVL PM/IDS: CPT

## 2021-08-26 PROCEDURE — 93294 REM INTERROG EVL PM/LDLS PM: CPT

## 2021-09-01 ENCOUNTER — APPOINTMENT (OUTPATIENT)
Dept: CARDIOLOGY | Facility: CLINIC | Age: 76
End: 2021-09-01
Payer: MEDICARE

## 2021-09-01 PROCEDURE — 93280 PM DEVICE PROGR EVAL DUAL: CPT

## 2021-11-30 ENCOUNTER — NON-APPOINTMENT (OUTPATIENT)
Age: 76
End: 2021-11-30

## 2021-11-30 ENCOUNTER — APPOINTMENT (OUTPATIENT)
Dept: CARDIOLOGY | Facility: CLINIC | Age: 76
End: 2021-11-30

## 2021-12-01 ENCOUNTER — APPOINTMENT (OUTPATIENT)
Dept: CARDIOLOGY | Facility: CLINIC | Age: 76
End: 2021-12-01
Payer: MEDICARE

## 2021-12-01 PROCEDURE — 93294 REM INTERROG EVL PM/LDLS PM: CPT

## 2021-12-01 PROCEDURE — 93296 REM INTERROG EVL PM/IDS: CPT

## 2022-01-11 NOTE — DISCHARGE NOTE PROVIDER - CARE PROVIDERS DIRECT ADDRESSES
Satisfactory ,[email or web address]

## 2022-03-02 ENCOUNTER — APPOINTMENT (OUTPATIENT)
Dept: CARDIOLOGY | Facility: CLINIC | Age: 77
End: 2022-03-02

## 2022-03-09 ENCOUNTER — APPOINTMENT (OUTPATIENT)
Dept: CARDIOLOGY | Facility: CLINIC | Age: 77
End: 2022-03-09
Payer: MEDICARE

## 2022-03-09 VITALS
DIASTOLIC BLOOD PRESSURE: 84 MMHG | RESPIRATION RATE: 14 BRPM | OXYGEN SATURATION: 97 % | TEMPERATURE: 97.3 F | SYSTOLIC BLOOD PRESSURE: 124 MMHG | BODY MASS INDEX: 24.28 KG/M2 | HEART RATE: 75 BPM | WEIGHT: 184 LBS

## 2022-03-09 PROCEDURE — 93280 PM DEVICE PROGR EVAL DUAL: CPT

## 2022-06-01 ENCOUNTER — APPOINTMENT (OUTPATIENT)
Dept: CARDIOLOGY | Facility: CLINIC | Age: 77
End: 2022-06-01
Payer: MEDICARE

## 2022-06-01 ENCOUNTER — NON-APPOINTMENT (OUTPATIENT)
Age: 77
End: 2022-06-01

## 2022-06-01 PROCEDURE — 93296 REM INTERROG EVL PM/IDS: CPT

## 2022-06-01 PROCEDURE — 93294 REM INTERROG EVL PM/LDLS PM: CPT

## 2022-08-31 ENCOUNTER — APPOINTMENT (OUTPATIENT)
Dept: CARDIOLOGY | Facility: CLINIC | Age: 77
End: 2022-08-31

## 2022-08-31 ENCOUNTER — NON-APPOINTMENT (OUTPATIENT)
Age: 77
End: 2022-08-31

## 2022-08-31 PROCEDURE — 93294 REM INTERROG EVL PM/LDLS PM: CPT

## 2022-08-31 PROCEDURE — 93296 REM INTERROG EVL PM/IDS: CPT

## 2022-11-30 ENCOUNTER — APPOINTMENT (OUTPATIENT)
Dept: CARDIOLOGY | Facility: CLINIC | Age: 77
End: 2022-11-30

## 2022-11-30 ENCOUNTER — NON-APPOINTMENT (OUTPATIENT)
Age: 77
End: 2022-11-30

## 2022-11-30 PROCEDURE — 93296 REM INTERROG EVL PM/IDS: CPT

## 2022-11-30 PROCEDURE — 93294 REM INTERROG EVL PM/LDLS PM: CPT

## 2022-12-06 ENCOUNTER — NON-APPOINTMENT (OUTPATIENT)
Age: 77
End: 2022-12-06

## 2022-12-06 ENCOUNTER — APPOINTMENT (OUTPATIENT)
Dept: CARDIOLOGY | Facility: CLINIC | Age: 77
End: 2022-12-06

## 2022-12-06 VITALS
DIASTOLIC BLOOD PRESSURE: 78 MMHG | WEIGHT: 188 LBS | BODY MASS INDEX: 24.92 KG/M2 | SYSTOLIC BLOOD PRESSURE: 112 MMHG | HEART RATE: 83 BPM | HEIGHT: 73 IN | TEMPERATURE: 97.1 F | OXYGEN SATURATION: 98 %

## 2022-12-06 PROCEDURE — 99214 OFFICE O/P EST MOD 30 MIN: CPT

## 2022-12-06 PROCEDURE — 93308 TTE F-UP OR LMTD: CPT

## 2022-12-06 PROCEDURE — 93000 ELECTROCARDIOGRAM COMPLETE: CPT

## 2022-12-06 RX ORDER — AMLODIPINE BESYLATE 2.5 MG/1
2.5 TABLET ORAL DAILY
Qty: 90 | Refills: 3 | Status: COMPLETED | COMMUNITY
Start: 2021-02-17 | End: 2022-12-06

## 2022-12-06 RX ORDER — DOXYCYCLINE HYCLATE 100 MG/1
100 TABLET ORAL
Qty: 20 | Refills: 0 | Status: COMPLETED | COMMUNITY
Start: 2021-05-17 | End: 2022-12-06

## 2022-12-06 NOTE — PHYSICAL EXAM
[General Appearance - Well Developed] : well developed [Normal Appearance] : normal appearance [Well Groomed] : well groomed [General Appearance - Well Nourished] : well nourished [No Deformities] : no deformities [General Appearance - In No Acute Distress] : no acute distress [] : no respiratory distress [Respiration, Rhythm And Depth] : normal respiratory rhythm and effort [Exaggerated Use Of Accessory Muscles For Inspiration] : no accessory muscle use [Auscultation Breath Sounds / Voice Sounds] : lungs were clear to auscultation bilaterally [Heart Rate And Rhythm] : heart rate and rhythm were normal [Heart Sounds] : normal S1 and S2 [Arterial Pulses Normal] : the arterial pulses were normal [Edema] : no peripheral edema present [Veins - Varicosity Changes] : no varicosital changes were noted in the lower extremities [Abnormal Walk] : normal gait [Gait - Sufficient For Exercise Testing] : the gait was sufficient for exercise testing [Nail Clubbing] : no clubbing of the fingernails [Cyanosis, Localized] : no localized cyanosis [FreeTextEntry1] : 1-2/6 lsb sm, no gallop/rub/heave or click, pacemaker site stable

## 2022-12-06 NOTE — REASON FOR VISIT
[Symptom and Test Evaluation] : symptom and test evaluation [Arrhythmia/ECG Abnorrmalities] : arrhythmia/ECG abnormalities [Hypertension] : hypertension [FreeTextEntry3] : Dr. 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  Complain of postural dizziness. .  No significant increase in faster heart rate.  Sometimes makes him cough.  Last for few seconds.  3-5 times a day.  Not associate with chest pain shortness of breath.  Intermittent mild dizziness but it is postural.  No near syncopal or syncopal event.  No other changes in medication.  Slightly anxious.\par No pacemaker site problems.\par I have reviewed his blood pressure readings at home and Holter monitor.

## 2022-12-06 NOTE — CARDIOLOGY SUMMARY
[LVEF ___%] : LVEF [unfilled]% [Mild] : mild mitral regurgitation [___] : [unfilled] [de-identified] : December 6, 2022.  Ventricular paced rhythm [de-identified] : June 28, 2021.  Holter monitor 24 hours.  Normal sinus rhythm.  Intermittent paced beats.  Premature atrial beats.  Occasional sequential up to 6 beats.  Few seconds to about 9 minutes of atrial tachycardia.  Premature ventricular beats were rare.  Though his symptoms corresponded to normal sinus rhythm.\par \par Blood pressure readings at home are consistently overall less than 140/90.  Closer to less than 130/80 in majority of the occasions.

## 2022-12-06 NOTE — REVIEW OF SYSTEMS
[Palpitations] : palpitations [Cough] : cough [Dizziness] : dizziness [Negative] : Heme/Lymph [SOB] : no shortness of breath [Dyspnea on exertion] : not dyspnea during exertion [Chest Discomfort] : no chest discomfort [Lower Ext Edema] : no extremity edema [Leg Claudication] : no intermittent leg claudication [Orthopnea] : no orthopnea [PND] : no PND [Syncope] : no syncope [Wheezing] : no wheezing [Coughing Up Blood] : no hemoptysis [Snoring] : no snoring [Tremor] : no tremor was seen [Numbness (Hypoesthesia)] : no numbness [Convulsions] : no convulsions [Tingling (Paresthesia)] : no tingling [Weakness] : no weakness [Limb Weakness (Paresis)] : no limb weakness (Paresis) [Speech Disturbance] : no speech disturbance

## 2022-12-06 NOTE — DISCUSSION/SUMMARY
[FreeTextEntry1] : 75-year-old gentleman with above medical history and active medical problems as noted below \par #1  Dual-chamber permanent pacemaker.  Intermittent palpitation.  Occasional cough associated with.  Postural dizziness.  No orthostasis.  Stable blood pressure intermittent.  Short episodes of atrial tachycardia's.\par No significant pacemaker related symptoms.  No diaphragmatic stimulation.  No symptoms with A or V pacing at a faster heart rate.\par   We will try to keep it less than 140/90 preferably less than 130/80 in presence of thoracic aortic ectasia.  If continued intermittent palpitation metoprolol needs to be increased because of underlying atrial arrhythmias.\par #2 Mitral valve prolapse, mild to moderate mitral regurgitation. Normal pulmonary pressure. Borderline RV dimensions. Stable thoracic aortic dimensions.\par #3  essential hypertension. Hypertensive heart disease. No signs of congestive heart failure. No renal deficiency.  Medication change as noted above\par #4 hyperlipidemia  tolerating medications well. Continue lifestyle modification and follow  labs on a regular basis.\par #5 paroxysmal supraventricular tachycardia. Status post ablation of AVNRT. No symptomatic recurrence.  Paroxysmal atrial tachycardia.  Short episodes.  On metoprolol.  If more symptoms increase dose.\par #6 thoracic aortic ectasia. Stable. No significant worsening. Continue blood pressure, heart rate control. \par He understands high risk symptoms to call 911. 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  8 being evaluated with hematology/oncology.  Echocardiogram indeterminate specifically because of apical RV pacing.  Recommended nuclear amyloid scan to assess cardiac involvement\par \par Counseling regarding low saturated fat, low carbohydrate intake was reviewed. Active lifestyle and regular. Exercise is along with weight maintenance is advised.\par All the above were at length reviewed. Answered all the questions. Thank you very much for this kind referral. Please do not hesitate to give me a call for any question.\par Part of this transcription was done with voice recognition software and phonetically similar errors are common. I apologize for that. Please donot hesitate to call for any questions due to above.\par \par Sincerely,\par Karthikeyan Marino MD,FACC,FASE\par \par

## 2022-12-06 NOTE — ASSESSMENT
[FreeTextEntry1] : past tests for reference:\par \par CTA of the coronary arteries in 2009 was overall nonobstructive \par \par •Carotid Doppler study 7-30-14 showed mild atherosclerosis, nonobstructive. No significant change when compared to before.\par  Stress myocardial perfusion scan. 10/6/2015. 7 minutes and 26 seconds of Eber protocol 10.1 METs of workload.Nonischemic myocardial perfusion scan in a limited study.\par labs 7-29-16 Na+ 144, K 4.2, bun/creat 19/1.09, AST 21, ALT 27, LDL 83, HDL 44, triglycerides 105, hgba1c 5.8, wbc 3.8, h/h 14.2/41.5, plat 161\par  ekg 11-22-16 NSR with 1av and nsstt. \par •echo 11-14-16 ef 60% with moderate MR, calcified trileaflet AV with normal opening, mild to moderate AR, aortic rot 3.3cm, moderate LAE, normal ventricular function, midl diastolic dysfunctioin, minimal TR, normal pulmonary pressures. \par Carotid Doppler study December 20, 2017. Nose significant obstructive disease.\par Echocardiogram dated December 28 2017. LV ejection fraction 60% mild mitral and aortic regurgitation.\par Labs December 2, 2017. He is stable. CBC. Sodium 144, potassium 4.6, creatinine 1.08. LFTs normal. Total cholesterol 136.  LDL 70.\par \par Reviewed on July 26, 2018.\par EKG ordered and inspected by me. July 26, 2018. Indication palpitations, being dictation. Normal sinus rhythm. First-degree AV block. Normal intervals.\par \par Reviewed on December 19, 2018.\par Echocardiogram December 19, 2018. LV ejection fraction 55% with mitral prolapse. Mild to moderate mitral regurgitation. Ascending aorta, 4 cm. Borderline right ventricular dimensions. Mild left and right atrial enlargement. Normal pulmonary artery systolic pressure.\par Labs from November 10, 2018 were reviewed. HDL 43, LDL 73, triglycerides 90. LFT was normal. CBC and BMP were stable.\par \par Reviewed on July 24, 2019\par EKG. Sinus bradycardia , poor R-wave progression.\par \par Reviewed on December 31, 2019\par Labs December 26, 2019 was reviewed, which showed stable CBC, CMP, TSH, lipid panel.\par Echocardiogram December 31, 2019 EF 60% mitral prolapse mild-to-moderate mitral regurgitation, mild aortic regurgitation, normal left atrial size pulmonary pressures. 19. Overall, no significant new changes. P.\par \par Reviewed on July 6, 2020.\par Reviewed EKGs chest x-ray labs from emergency room at [place].\par \par Reviewed on August 18, 2020\par Labs from August 5, 2020, coronary angiography August 5, 2020 Which showed minimal irregularities, event monitor July 6, 2020 showing nonsustained ventricular tachycardia\par Echocardiogram December 31, 2019 were all reviewed.\par \par June 28, 2021.  Medtronic dual-chamber pacemaker was interrogated.  A and V pacing did not induce his symptoms.  No diaphragmatic stimulation noted.  There were no aberrant tachyarrhythmias.\par \par December 6, 2022.\par EKG reviewed\par Echocardiogram abnormal global strain though with RV apical pacing it is difficult to evaluate in relation to typical findings of amyloidosis

## 2022-12-08 ENCOUNTER — APPOINTMENT (OUTPATIENT)
Dept: CARDIOLOGY | Facility: CLINIC | Age: 77
End: 2022-12-08

## 2022-12-08 PROCEDURE — 78451 HT MUSCLE IMAGE SPECT SING: CPT

## 2022-12-08 PROCEDURE — A9502: CPT

## 2022-12-09 ENCOUNTER — NON-APPOINTMENT (OUTPATIENT)
Age: 77
End: 2022-12-09

## 2022-12-12 ENCOUNTER — APPOINTMENT (OUTPATIENT)
Dept: CARDIOLOGY | Facility: CLINIC | Age: 77
End: 2022-12-12

## 2022-12-12 PROCEDURE — 78803 RP LOCLZJ TUM SPECT 1 AREA: CPT

## 2022-12-14 ENCOUNTER — APPOINTMENT (OUTPATIENT)
Dept: CARDIOLOGY | Facility: CLINIC | Age: 77
End: 2022-12-14

## 2022-12-14 DIAGNOSIS — D89.89 OTHER SPECIFIED DISORDERS INVOLVING THE IMMUNE MECHANISM, NOT ELSEWHERE CLASSIFIED: ICD-10-CM

## 2022-12-14 PROCEDURE — 99214 OFFICE O/P EST MOD 30 MIN: CPT | Mod: 95

## 2022-12-14 RX ORDER — OMEPRAZOLE MAGNESIUM 20 MG/1
20 TABLET, DELAYED RELEASE ORAL DAILY
Refills: 0 | Status: DISCONTINUED | COMMUNITY
End: 2022-12-14

## 2022-12-14 NOTE — CARDIOLOGY SUMMARY
[LVEF ___%] : LVEF [unfilled]% [Mild] : mild mitral regurgitation [___] : [unfilled] [de-identified] : December 6, 2022.  Ventricular paced rhythm [de-identified] : June 28, 2021.  Holter monitor 24 hours.  Normal sinus rhythm.  Intermittent paced beats.  Premature atrial beats.  Occasional sequential up to 6 beats.  Few seconds to about 9 minutes of atrial tachycardia.  Premature ventricular beats were rare.  Though his symptoms corresponded to normal sinus rhythm.\par \par Blood pressure readings at home are consistently overall less than 140/90.  Closer to less than 130/80 in majority of the occasions. [de-identified] : PYP scan December 2022 no evidence of amyloidosis.

## 2022-12-14 NOTE — REASON FOR VISIT
[Symptom and Test Evaluation] : symptom and test evaluation [Arrhythmia/ECG Abnorrmalities] : arrhythmia/ECG abnormalities [Hypertension] : hypertension [FreeTextEntry3] : Dr. Oppenheimer [FreeTextEntry1] : Consent: Patient consented to virtual video visit.  He is in [facility].  I am in [facility] office\par Time: A total of  minutes was spent in review of pertinent medical records, discussion with the patient, evaluation of the patient problem and coordination of the care plan as part of this online visit.\par \par 75-year-old gentleman is referred to me for further cardiac evaluation in presence of recent finding of significant proteinuria and light chain gammopathy.  I have reviewed his PYP scan with him.\par Doing well otherwise no new complaints.  No chest pain no shortness of breath no PND orthopnea no palpitation dizziness near syncopal syncopal event\par No claudication pain\par No bleeding diathesis.

## 2022-12-14 NOTE — DISCUSSION/SUMMARY
[FreeTextEntry1] : 75-year-old gentleman with above medical history and active medical problems as noted below \par #1  Dual-chamber permanent pacemaker.  Intermittent palpitation.  Occasional cough associated with.  Postural dizziness.  No orthostasis.  Stable blood pressure intermittent.  Short episodes of atrial tachycardia's.\par No significant pacemaker related symptoms.  No diaphragmatic stimulation.  No symptoms with A or V pacing at a faster heart rate.\par   We will try to keep it less than 140/90 preferably less than 130/80 in presence of thoracic aortic ectasia.  If continued intermittent palpitation metoprolol needs to be increased because of underlying atrial arrhythmias.\par #2 Mitral valve prolapse, mild to moderate mitral regurgitation. Normal pulmonary pressure. Borderline RV dimensions. Stable thoracic aortic dimensions.\par #3  essential hypertension. Hypertensive heart disease. No signs of congestive heart failure. No renal deficiency.  Medication change as noted above\par #4 hyperlipidemia  tolerating medications well. Continue lifestyle modification and follow  labs on a regular basis.\par #5 paroxysmal supraventricular tachycardia. Status post ablation of AVNRT. No symptomatic recurrence.  Paroxysmal atrial tachycardia.  Short episodes.  On metoprolol.  If more symptoms increase dose.\par #6 thoracic aortic ectasia. Stable. No significant worsening. Continue blood pressure, heart rate control. \par He understands high risk symptoms to call 911. 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  Negative cardiac PYP scan.  Low probability of ATTR cardiac amyloid cardiomyopathy.  PET scan also negative according to him.  Continue follow-up with hematologist\par \par Counseling regarding low saturated fat, low carbohydrate intake was reviewed. Active lifestyle and regular. Exercise is along with weight maintenance is advised.\par All the above were at length reviewed. Answered all the questions. Thank you very much for this kind referral. Please do not hesitate to give me a call for any question.\par Part of this transcription was done with voice recognition software and phonetically similar errors are common. I apologize for that. Please donot hesitate to call for any questions due to above.\par \par Sincerely,\par Karthikeyan Marino MD,FACC,BHARATHI\par \par

## 2022-12-14 NOTE — ASSESSMENT
[FreeTextEntry1] : past tests for reference:\par \par CTA of the coronary arteries in 2009 was overall nonobstructive \par \par •Carotid Doppler study 7-30-14 showed mild atherosclerosis, nonobstructive. No significant change when compared to before.\par  Stress myocardial perfusion scan. 10/6/2015. 7 minutes and 26 seconds of Eber protocol 10.1 METs of workload.Nonischemic myocardial perfusion scan in a limited study.\par labs 7-29-16 Na+ 144, K 4.2, bun/creat 19/1.09, AST 21, ALT 27, LDL 83, HDL 44, triglycerides 105, hgba1c 5.8, wbc 3.8, h/h 14.2/41.5, plat 161\par  ekg 11-22-16 NSR with 1av and nsstt. \par •echo 11-14-16 ef 60% with moderate MR, calcified trileaflet AV with normal opening, mild to moderate AR, aortic rot 3.3cm, moderate LAE, normal ventricular function, midl diastolic dysfunctioin, minimal TR, normal pulmonary pressures. \par Carotid Doppler study December 20, 2017. Nose significant obstructive disease.\par Echocardiogram dated December 28 2017. LV ejection fraction 60% mild mitral and aortic regurgitation.\par Labs December 2, 2017. He is stable. CBC. Sodium 144, potassium 4.6, creatinine 1.08. LFTs normal. Total cholesterol 136.  LDL 70.\par \par Reviewed on July 26, 2018.\par EKG ordered and inspected by me. July 26, 2018. Indication palpitations, being dictation. Normal sinus rhythm. First-degree AV block. Normal intervals.\par \par Reviewed on December 19, 2018.\par Echocardiogram December 19, 2018. LV ejection fraction 55% with mitral prolapse. Mild to moderate mitral regurgitation. Ascending aorta, 4 cm. Borderline right ventricular dimensions. Mild left and right atrial enlargement. Normal pulmonary artery systolic pressure.\par Labs from November 10, 2018 were reviewed. HDL 43, LDL 73, triglycerides 90. LFT was normal. CBC and BMP were stable.\par \par Reviewed on July 24, 2019\par EKG. Sinus bradycardia , poor R-wave progression.\par \par Reviewed on December 31, 2019\par Labs December 26, 2019 was reviewed, which showed stable CBC, CMP, TSH, lipid panel.\par Echocardiogram December 31, 2019 EF 60% mitral prolapse mild-to-moderate mitral regurgitation, mild aortic regurgitation, normal left atrial size pulmonary pressures. 19. Overall, no significant new changes. P.\par \par Reviewed on July 6, 2020.\par Reviewed EKGs chest x-ray labs from emergency room at [place].\par \par Reviewed on August 18, 2020\par Labs from August 5, 2020, coronary angiography August 5, 2020 Which showed minimal irregularities, event monitor July 6, 2020 showing nonsustained ventricular tachycardia\par Echocardiogram December 31, 2019 were all reviewed.\par \par June 28, 2021.  MedEffektif dual-chamber pacemaker was interrogated.  A and V pacing did not induce his symptoms.  No diaphragmatic stimulation noted.  There were no aberrant tachyarrhythmias.\par \par December 6, 2022.\par EKG reviewed\par Echocardiogram abnormal global strain though with RV apical pacing it is difficult to evaluate in relation to typical findings of amyloidosis\par \par Reviewed on December 14, 2022.\par PYP scan reviewed.

## 2023-02-03 NOTE — PROGRESS NOTE ADULT - SUBJECTIVE AND OBJECTIVE BOX
Department of Cardiology                                                                  [facility]/[place] [address] [address]                                                            Telephone: [phone]. Fax:[phone]                                                                         Post- Procedure Note: [facility]      Narrative:  74y  Male is now s/p left heart catheterization via right groin approach (angioseal used, no site complications) with Dr. Alexander  failed right radial approach initially  right groin precautions; angioseal used  LM normal  LAD normal  Cx normal  RCA normal  no cardiac intervention  contrast used: 40ml  heparin used: none  IVF used: 50ml       < from: Cardiac Cath Lab - Adult (08.05.20 @ 13:20) >  VENTRICLES: No left ventriculogram was performed.  CORONARY VESSELS: The coronary circulation is co-dominant.  LM:   --  LM: Normal. The vessel was normal sized.  LAD:   --  LAD: The vessel was normal sized. Angiography showed minor  luminal irregularities with no flow limiting lesions.  CX:   --Circumflex: The vessel was normal sized. Angiography showed minor  luminal irregularities with no flow limiting lesions.  RCA:   --  RCA: The vessel was normal sized. Angiography showed minor  luminal irregularities with no flow limiting lesions.  COMPLICATIONS: No complications occurred during the cath lab visit.  DIAGNOSTIC RECOMMENDATIONS: Luminal coronary irregularities. Continue  aggressive risk factor modification.  NB: the patient has a significant radial artery loop - if a cardiac  catheterization is to be considered in the future, an alternate site for  access should be used (i.e. femoral or left radial).  Prepared and signed by  Fredy Alexander MD      PAST MEDICAL & SURGICAL HISTORY:  GERD (gastroesophageal reflux disease)  BPH (benign prostatic hyperplasia)  Palpitations  SVT (supraventricular tachycardia)  History of cardiac radiofrequency ablation (RFA): 2017 with Dr. Donaldo yang at   NSVT (nonsustained ventricular tachycardia)  S/P tonsillectomy  H/O hand surgery    Home Medications:  aspirin 81 mg oral tablet: orally once a day (05 Aug 2020 10:46)  Avodart 0.5 mg oral capsule:  orally  (05 Aug 2020 10:46)  clonazePAM 1 mg oral tablet:  (05 Aug 2020 10:46)  Co Q-10 100 mg oral capsule:  orally  (05 Aug 2020 10:46)  Livalo 4 mg oral tablet:  orally  (05 Aug 2020 10:46)  metoprolol succinate 25 mg oral tablet, extended release: 1 tab(s) orally once a day (05 Aug 2020 10:46)  PriLOSEC OTC 20 mg oral delayed release tablet: 1 tab(s) orally once a day (05 Aug 2020 10:46)  Probiotic Formula oral capsule:  (05 Aug 2020 10:46)  telmisartan-hydrochlorothiazide 80mg-12.5mg oral tablet: 1 tab(s) orally once a day (05 Aug 2020 10:46)  Vitamin D3 1000 intl units oral tablet:  orally  (05 Aug 2020 10:46)    Patient is a 74y old  Male who presents with a chief complaint of LHC due to SOB/ fatigue /hx CAD (05 Aug 2020 10:00)    HEALTH ISSUES - PROBLEM Dx:  Abnormal stress test        HPI:  Narrative: 75 y/o M, never smoker, with PMHx HTN, HLD, CAD, BPH, thoracic aortic ectasia, mild MR, AR, SVT RF ablation  (AVNRT; 2/26/17), BPH presents today in anticipation of a left heart cardiac catheterization with Dr. Alexander.  He endorses SOB, palpitations and fatigue, denies chest pain, lower extremity edema, pre syncope, syncope, fever, chills, N/V.     Stress 7/29/20 rhythm sinus bradycardia with T wave inversions lead III EF 56%, normal LV dysfunction, LV normal in size, small, mild defect in apical lateral lateral wall that is partially reversible, suggestive of mild ischemia    Echo 12/31/19: mitral valve prolapse involving the posterior mitral leaflet, mild to mod MR, thickened aortic valve, mild AR, normal LV and wall thickness, normal LV systolic fxn, no segmental wall motion abnormalities, mild diastolic dysfunction mild RA enlgmt, no intracardiac masses or thrombus seen    ZIO monitor 7/2020 revealed SR, 16 beats NSVT    ASA 2  MALL 2  BRA 1.4%  Cr 1.01  GFR 72 (05 Aug 2020 10:00)    General: No fatigue, no fevers/chills  Respiratory: No dyspnea, no cough, no wheeze  CV: No chest pain, no palpitations  Abd: No nausea  Neuro: No headache, no dizziness  No Known Allergies      Objective:  Vital Signs Last 24 Hrs  T(C): 36.7 (05 Aug 2020 10:30), Max: 36.7 (05 Aug 2020 10:30)  T(F): 98 (05 Aug 2020 10:30), Max: 98 (05 Aug 2020 10:30)  HR: 52 (05 Aug 2020 14:20) (45 - 58)  BP: 158/84 (05 Aug 2020 14:20) (158/84 - 180/88)  BP(mean): --  RR: 16 (05 Aug 2020 14:20) (15 - 16)  SpO2: 96% (05 Aug 2020 14:20) (96% - 98%)    CM: SR  Neuro: A&OX3, CN 2-12 intact  HEENT: NC, AT  Lungs: CTA B/L  CV: S1, S2, no murmur, RRR  Abd: Soft  Right Groin: Soft, no bleeding, no hematoma  Extremity: + distal pulses                          15.3   6.05  )-----------( 190      ( 05 Aug 2020 10:43 )             46.3     08-05    142  |  100  |  17.0  ----------------------------<  109<H>  4.0   |  30.0<H>  |  1.01    Ca    9.9      05 Aug 2020 10:43  Mg     2.2     08-05    PT/INR - ( 05 Aug 2020 10:43 )   PT: 10.6 sec;   INR: 0.91 ratio    PTT - ( 05 Aug 2020 10:43 )  PTT:38.4 sec    74y  Male is now s/p left heart catheterization via right groin approach (angioseal used, no site complications) with Dr. Alexander    -post cardiac cath orders  -right  groin precautions  -continue current medical therapy  -bedrest x1 hour post cath  -ambulate and home 30 minutes after bedrest complete  -follow up outpt with cardiologist as needed Private car

## 2023-02-15 ENCOUNTER — NON-APPOINTMENT (OUTPATIENT)
Age: 78
End: 2023-02-15

## 2023-02-15 ENCOUNTER — APPOINTMENT (OUTPATIENT)
Dept: CARDIOLOGY | Facility: CLINIC | Age: 78
End: 2023-02-15
Payer: MEDICARE

## 2023-02-15 VITALS
HEIGHT: 73 IN | OXYGEN SATURATION: 99 % | DIASTOLIC BLOOD PRESSURE: 80 MMHG | HEART RATE: 84 BPM | WEIGHT: 190 LBS | SYSTOLIC BLOOD PRESSURE: 110 MMHG | BODY MASS INDEX: 25.18 KG/M2

## 2023-02-15 DIAGNOSIS — E85.81 LIGHT CHAIN (AL) AMYLOIDOSIS: ICD-10-CM

## 2023-02-15 PROCEDURE — 93000 ELECTROCARDIOGRAM COMPLETE: CPT

## 2023-02-15 PROCEDURE — 99215 OFFICE O/P EST HI 40 MIN: CPT

## 2023-03-01 ENCOUNTER — NON-APPOINTMENT (OUTPATIENT)
Age: 78
End: 2023-03-01

## 2023-03-01 ENCOUNTER — APPOINTMENT (OUTPATIENT)
Dept: CARDIOLOGY | Facility: CLINIC | Age: 78
End: 2023-03-01
Payer: MEDICARE

## 2023-03-01 PROCEDURE — 93294 REM INTERROG EVL PM/LDLS PM: CPT

## 2023-03-01 PROCEDURE — 93296 REM INTERROG EVL PM/IDS: CPT

## 2023-03-03 ENCOUNTER — RESULT REVIEW (OUTPATIENT)
Age: 78
End: 2023-03-03

## 2023-03-03 ENCOUNTER — TRANSCRIPTION ENCOUNTER (OUTPATIENT)
Age: 78
End: 2023-03-03

## 2023-03-03 ENCOUNTER — OUTPATIENT (OUTPATIENT)
Dept: OUTPATIENT SERVICES | Facility: HOSPITAL | Age: 78
LOS: 1 days | End: 2023-03-03
Payer: MEDICARE

## 2023-03-03 VITALS
OXYGEN SATURATION: 98 % | RESPIRATION RATE: 16 BRPM | DIASTOLIC BLOOD PRESSURE: 67 MMHG | SYSTOLIC BLOOD PRESSURE: 140 MMHG | HEART RATE: 62 BPM

## 2023-03-03 VITALS
SYSTOLIC BLOOD PRESSURE: 158 MMHG | WEIGHT: 190.04 LBS | RESPIRATION RATE: 16 BRPM | TEMPERATURE: 98 F | OXYGEN SATURATION: 97 % | HEART RATE: 62 BPM | DIASTOLIC BLOOD PRESSURE: 94 MMHG | HEIGHT: 72 IN

## 2023-03-03 DIAGNOSIS — Z98.890 OTHER SPECIFIED POSTPROCEDURAL STATES: Chronic | ICD-10-CM

## 2023-03-03 DIAGNOSIS — E85.81 LIGHT CHAIN (AL) AMYLOIDOSIS: ICD-10-CM

## 2023-03-03 DIAGNOSIS — I47.2 VENTRICULAR TACHYCARDIA: Chronic | ICD-10-CM

## 2023-03-03 DIAGNOSIS — I43 CARDIOMYOPATHY IN DISEASES CLASSIFIED ELSEWHERE: ICD-10-CM

## 2023-03-03 DIAGNOSIS — Z90.89 ACQUIRED ABSENCE OF OTHER ORGANS: Chronic | ICD-10-CM

## 2023-03-03 DIAGNOSIS — E85.4 ORGAN-LIMITED AMYLOIDOSIS: ICD-10-CM

## 2023-03-03 LAB
ANION GAP SERPL CALC-SCNC: 9 MMOL/L — SIGNIFICANT CHANGE UP (ref 5–17)
BUN SERPL-MCNC: 22 MG/DL — SIGNIFICANT CHANGE UP (ref 7–23)
CALCIUM SERPL-MCNC: 9.4 MG/DL — SIGNIFICANT CHANGE UP (ref 8.4–10.5)
CHLORIDE SERPL-SCNC: 103 MMOL/L — SIGNIFICANT CHANGE UP (ref 96–108)
CO2 SERPL-SCNC: 30 MMOL/L — SIGNIFICANT CHANGE UP (ref 22–31)
CREAT SERPL-MCNC: 0.88 MG/DL — SIGNIFICANT CHANGE UP (ref 0.5–1.3)
EGFR: 88 ML/MIN/1.73M2 — SIGNIFICANT CHANGE UP
GLUCOSE SERPL-MCNC: 96 MG/DL — SIGNIFICANT CHANGE UP (ref 70–99)
HCT VFR BLD CALC: 46.7 % — SIGNIFICANT CHANGE UP (ref 39–50)
HGB BLD-MCNC: 15.2 G/DL — SIGNIFICANT CHANGE UP (ref 13–17)
MCHC RBC-ENTMCNC: 32.1 PG — SIGNIFICANT CHANGE UP (ref 27–34)
MCHC RBC-ENTMCNC: 32.5 GM/DL — SIGNIFICANT CHANGE UP (ref 32–36)
MCV RBC AUTO: 98.5 FL — SIGNIFICANT CHANGE UP (ref 80–100)
NRBC # BLD: 0 /100 WBCS — SIGNIFICANT CHANGE UP (ref 0–0)
PLATELET # BLD AUTO: 186 K/UL — SIGNIFICANT CHANGE UP (ref 150–400)
POTASSIUM SERPL-MCNC: 4.2 MMOL/L — SIGNIFICANT CHANGE UP (ref 3.5–5.3)
POTASSIUM SERPL-SCNC: 4.2 MMOL/L — SIGNIFICANT CHANGE UP (ref 3.5–5.3)
RBC # BLD: 4.74 M/UL — SIGNIFICANT CHANGE UP (ref 4.2–5.8)
RBC # FLD: 12.7 % — SIGNIFICANT CHANGE UP (ref 10.3–14.5)
SODIUM SERPL-SCNC: 142 MMOL/L — SIGNIFICANT CHANGE UP (ref 135–145)
WBC # BLD: 5.8 K/UL — SIGNIFICANT CHANGE UP (ref 3.8–10.5)
WBC # FLD AUTO: 5.8 K/UL — SIGNIFICANT CHANGE UP (ref 3.8–10.5)

## 2023-03-03 PROCEDURE — 88307 TISSUE EXAM BY PATHOLOGIST: CPT | Mod: 26

## 2023-03-03 PROCEDURE — 88307 TISSUE EXAM BY PATHOLOGIST: CPT

## 2023-03-03 PROCEDURE — C1769: CPT

## 2023-03-03 PROCEDURE — 93306 TTE W/DOPPLER COMPLETE: CPT | Mod: 26

## 2023-03-03 PROCEDURE — C1894: CPT

## 2023-03-03 PROCEDURE — 80048 BASIC METABOLIC PNL TOTAL CA: CPT

## 2023-03-03 PROCEDURE — 85027 COMPLETE CBC AUTOMATED: CPT

## 2023-03-03 PROCEDURE — 93505 ENDOMYOCARDIAL BIOPSY: CPT

## 2023-03-03 PROCEDURE — 93010 ELECTROCARDIOGRAM REPORT: CPT

## 2023-03-03 PROCEDURE — 93505 ENDOMYOCARDIAL BIOPSY: CPT | Mod: 26

## 2023-03-03 PROCEDURE — 88313 SPECIAL STAINS GROUP 2: CPT

## 2023-03-03 PROCEDURE — C1887: CPT

## 2023-03-03 PROCEDURE — 93306 TTE W/DOPPLER COMPLETE: CPT

## 2023-03-03 PROCEDURE — 93005 ELECTROCARDIOGRAM TRACING: CPT

## 2023-03-03 PROCEDURE — 88313 SPECIAL STAINS GROUP 2: CPT | Mod: 26

## 2023-03-03 PROCEDURE — 99152 MOD SED SAME PHYS/QHP 5/>YRS: CPT

## 2023-03-03 RX ORDER — CHOLECALCIFEROL (VITAMIN D3) 125 MCG
0 CAPSULE ORAL
Qty: 0 | Refills: 0 | DISCHARGE

## 2023-03-03 RX ORDER — UBIDECARENONE 100 MG
2 CAPSULE ORAL
Qty: 0 | Refills: 0 | DISCHARGE

## 2023-03-03 RX ORDER — L.ACIDOPH/B.ANIMALIS/B.LONGUM 15B CELL
0 CAPSULE ORAL
Qty: 0 | Refills: 0 | DISCHARGE

## 2023-03-03 RX ORDER — DUTASTERIDE 0.5 MG/1
0 CAPSULE, LIQUID FILLED ORAL
Qty: 0 | Refills: 0 | DISCHARGE

## 2023-03-03 RX ORDER — PITAVASTATIN CALCIUM 1.04 MG/1
0 TABLET, FILM COATED ORAL
Qty: 0 | Refills: 0 | DISCHARGE

## 2023-03-03 RX ORDER — METOPROLOL TARTRATE 50 MG
1 TABLET ORAL
Qty: 0 | Refills: 0 | DISCHARGE

## 2023-03-03 RX ORDER — UBIDECARENONE 100 MG
0 CAPSULE ORAL
Qty: 0 | Refills: 0 | DISCHARGE

## 2023-03-03 RX ORDER — PROPRANOLOL HCL 160 MG
1 CAPSULE, EXTENDED RELEASE 24HR ORAL
Qty: 0 | Refills: 0 | DISCHARGE

## 2023-03-03 RX ORDER — SERTRALINE 25 MG/1
1 TABLET, FILM COATED ORAL
Qty: 0 | Refills: 0 | DISCHARGE

## 2023-03-03 RX ORDER — CLONAZEPAM 1 MG
0 TABLET ORAL
Qty: 0 | Refills: 0 | DISCHARGE

## 2023-03-03 RX ORDER — ASPIRIN/CALCIUM CARB/MAGNESIUM 324 MG
0 TABLET ORAL
Qty: 0 | Refills: 0 | DISCHARGE

## 2023-03-03 RX ORDER — TELMISARTAN AND HYDROCHLOROTHIAZIDE 40; 12.5 MG/1; MG/1
1 TABLET ORAL
Qty: 0 | Refills: 0 | DISCHARGE

## 2023-03-03 RX ORDER — OMEPRAZOLE 10 MG/1
1 CAPSULE, DELAYED RELEASE ORAL
Qty: 0 | Refills: 0 | DISCHARGE

## 2023-03-03 RX ORDER — ASPIRIN/CALCIUM CARB/MAGNESIUM 324 MG
1 TABLET ORAL
Qty: 0 | Refills: 0 | DISCHARGE

## 2023-03-03 NOTE — ASU DISCHARGE PLAN (ADULT/PEDIATRIC) - DIET/FLUID RESTRICTION
No change ELVA FARRAR  [id number]  [address]  [place] [address]  Phone: [phone]  Fax: [phone]  Follow Up Time:

## 2023-03-03 NOTE — ASU DISCHARGE PLAN (ADULT/PEDIATRIC) - CARE PROVIDER_API CALL
Ewa Gray)  Cardiology; Interventional Cardiology  [facility]- Dept of Cardiology, [address] [place] [address]  Phone: [phone]  Fax: [phone]  Follow Up Time:

## 2023-03-03 NOTE — ASU DISCHARGE PLAN (ADULT/PEDIATRIC) - ASU DC SPECIAL INSTRUCTIONSFT
Wound Care:   the day AFTER your procedure remove bandage GENTLY, and clean using  mild soap and gentle warm, water stream, pat dry. leave OPEN to air. YOU MAY SHOWER   DO NOT apply lotions, creams, ointments, powder, perfumes to your incision site  DO NOT SOAK your site for 1 week ( no baths, no pools, no tubs, etc...)  Check  your groin and /or wrist daily.A small amount of bruising, and soarness are normal    ACTIVITY: for 24 hours   - DO NOT DRIVE  - DO NOT make any important decisions or sign legal documents   - DO NOT operate heavy machineries   - you may resume sexual activity in 48 hours, unless otherwise instructed by your cardiologist     If your procedure was done through the WRIST: for the NEXT 3DAYS:  - avoid pushing, pulling, with that affected wrist   - avoid repeated movement of that hand and wrist ( eg: typing, hammering)  - DO NOT LIFT anything more than 5 lbs     If your procedure was done through the GROIN: for the NEXT 5 DAYS  - Limit climbing stairs, DO NOT soak in bathtub or pool  - no strenuous activities, pushing, pulling, straining  - Do not lift anything 10lbs or heavier     MEDICATION:   take your medications as explained ( see discharge paperwork)   If you received a STENT, you will be taking antiplatelet medications to KEEP YOUR STENT OPEN ( eg: Aspirin, Plavix, Brilinta, Effient, etc).  Take as prescribed DO NOT STOP taking them without consulting with your cardiologist first.     Follow heart healthy diet recommended by your doctor, , if you smoke STOP SMOKING ( may call [phone] for center of tobacco control if you need assistance)     CALL your doctor to make appointment in 2 WEEKS     ***CALL YOUR DOCTOR***  if you experience: fever, chills, body aches, or severe pain, swelling, redness, heat or yellow discharge at incision site  If you experience Bleeding or excruciating pain at the procedural site, swelling ( golf ball size) at your procedural site  If you experience CHEST PAIN  If you experience extremity numbness, tingling, temperature change ( of your procedural site)   If you are unable to reach your doctor, you may contact:   -Cardiology Office at [facility] at [phone] or   - [facility] [phone]  - [facility] [phone]

## 2023-03-03 NOTE — H&P CARDIOLOGY - EF ASSESSMENT
To JOSEPH-please advise. Recommend recheck? Or continue with supportive care for another week?  Patient was seen on 1/17 Normal

## 2023-03-03 NOTE — H&P CARDIOLOGY - HISTORY OF PRESENT ILLNESS
This is a 76 y/o  Male with ZIO monitor 7/2020 revealed SR, 16 beats NSVT known implantable devices noted COVID 19 negative Vaccinated with PMHx HTN, HLD, CAD, BPH, thoracic aortic ectasia, mild MR, AR, SVT RF ablation  (AVNRT; 2/26/17), and BPH had Stress 7/29/20 rhythm sinus bradycardia with T wave inversions lead III EF 56%, normal LV dysfunction, LV normal in size, small, mild defect in apical lateral lateral wall that is partially reversible, suggestive of mild ischemia    Echo 12/31/19: mitral valve prolapse involving the posterior mitral leaflet, mild to mod MR, thickened aortic valve, mild AR, normal LV and wall thickness, normal LV systolic fxn, no segmental wall motion abnormalities, mild diastolic dysfunction mild RA enlgmt, no intracardiac masses or thrombus seen   This is a 78 y/o  Male with ZIO monitor 7/2020 revealed SR, 16 beats NSVT known implantable devices PPM COVID 19 negative Vaccinated with Moderna x 2 Booster x 2 with  PMHx HTN, HLD, CAD, BPH, Thoracic aortic ectasia, Mild MR, AR, SVT RF ablation  (AVNRT; 2/26/17), and BPH had Stress 7/29/20 rhythm sinus bradycardia with T wave inversions lead III EF 56%, normal LV dysfunction, LV normal in size, small, mild defect in apical lateral lateral wall that is partially reversible, suggestive of mild ischemia Echo 12/31/19: mitral valve prolapse involving the posterior mitral leaflet, mild to mod MR, thickened aortic valve, mild AR, normal LV and wall thickness, normal LV systolic fxn, no segmental wall motion abnormalities, mild diastolic dysfunction mild RA enlargement, no intracardiac masses or thrombus seen. Presents today for RHC with Biopsy to r/o Amyloidosis with Dr. Gray.   Cardiologist Dr. Holliday  < from: Cardiac Cath Lab - Adult (08.05.20 @ 13:20) >  VENTRICLES: No left ventriculogram was performed.  CORONARY VESSELS: The coronary circulation is co-dominant.  LM:   --  LM: Normal. The vessel was normal sized.  LAD:   --  LAD: The vessel was normal sized. Angiography showed minor  luminal irregularities with no flow limiting lesions.  CX:   --Circumflex: The vessel was normal sized. Angiography showed minor  luminal irregularities with no flow limiting lesions.  RCA:   --  RCA: The vessel was normal sized. Angiography showed minor  luminal irregularities with no flow limiting lesions.  COMPLICATIONS: No complications occurred during the cath lab visit.  DIAGNOSTIC RECOMMENDATIONS: Luminal coronary irregularities. Continue  aggressive risk factor modification.  NB: the patient has a significant radial artery loop - if a cardiac  catheterization is to be considered in the future, an alternate site for  access should be used (i.e. femoral or left radial).  Prepared and signed by  Fredy Alexander MD  Signed 08/05/2020 14:17:58    < end of copied text >

## 2023-03-03 NOTE — ASU PATIENT PROFILE, ADULT - FALL HARM RISK - UNIVERSAL INTERVENTIONS
Bed in lowest position, wheels locked, appropriate side rails in place/Call bell, personal items and telephone in reach/Instruct patient to call for assistance before getting out of bed or chair/Non-slip footwear when patient is out of bed/[place]ri[place] to call system/Physically safe environment - no spills, clutter or unnecessary equipment/Purposeful Proactive Rounding/Room/bathroom lighting operational, light cord in reach

## 2023-03-03 NOTE — ASU DISCHARGE PLAN (ADULT/PEDIATRIC) - DISCHARGE PLAN IS COMPLETE AND GIVEN TO PATIENT
[facility]  HOSPITAL MEDICINE PROGRESS NOTE   Patient: Varinder Robertson  Today's Date: 9/22/2022    YOB: 1969  Admission Date: 9/19/2022    MRN: [id number]  Inpatient LOS: 3    Room: [facility]/  Hospital Day: Hospital Day: 4    Subjective   HISTORY AND SUBJECTIVE COMPLAINTS     Chief Complaint:   Right arm pain, compartment syndrome    Interval History / Subjective:   Scheduled for closure of fasciotomy.  States pain is well controlled.  Blood pressure better controlled on scheduled clonidine, however, few hypotensive episodes.    Hospital Course:  Varinder Robertson is a 53 year old male who presented on 9/19/2022 with complaints of Arm Pain and Leg Pain  The patient presented to emergency department with severe and constant right arm pain.  He has been using cocaine and possibly heroin, was driving in the car together with his brother, and became unresponsive.  His brother let in sleep, thus the patient spent close to 13 hours sleeping over his right arm.  After waking up, he started to experience significant pain and swelling, came to the emergency department, diagnosed with severe rhabdomyolysis and compartment syndrome and underwent emergent fasciotomy.  Underwent closure 09/22/2022    ROS:  Pertinent systems negative except as above.    Objective   PHYSICAL EXAMINATION     Vital 24 Hour Range Most Recent Value   Temperature Temp  Min: 97 °F (36.1 °C)  Max: 98.8 °F (37.1 °C) 97 °F (36.1 °C)   Pulse Pulse  Min: 76  Max: 88 81   Respiratory Resp  Min: 14  Max: 22 16   Blood Pressure BP  Min: 84/62  Max: 157/86 107/68   Pulse Oximetry SpO2  Min: 94 %  Max: 98 % 94 %   Arterial BP No data recorded     O2 No data recorded       Recorded Intake and Output:    Intake/Output Summary (Last 24 hours) at 9/22/2022 1357  Last data filed at 9/22/2022 1132  Gross per 24 hour   Intake 620 ml   Output 825 ml   Net -205 ml      Recorded Last Stool Occurrence:       Vital Most Recent Value First Value   Weight  109 kg (240 lb 4.8 oz) Weight: 110.6 kg (243 lb 13.3 oz)   Height 5' 7\" (170.2 cm) Height: 5' 7\" (170.2 cm)   BMI 37.64 N/A     General: Looks well well developed, well nourished  CV: regular rate and rhythm  Resp: clear to auscultation bilaterally  Abd: soft  Ext: Right arm covered with sterile dressings and bandages  Skin: no rashes, lesions, or ulcers noted  Neuro: CN 2-12 grossly intact  Psych: oriented to time, place and person    TEST RESULTS     Labs: The Laboratory values listed below have been reviewed and pertinent findings discussed in the Assessment and Plan.    Laboratory values:   Recent Labs   Lab 09/22/22  0324 09/21/22 0449 09/19/22  1758   WBC 6.6 7.8 11.5*   HGB 13.4 13.0 15.3   HCT 39.5 38.0* 46.0    143 158       Recent Labs   Lab 09/22/22  0324 09/21/22  1531 09/21/22 0449 09/20/22  0942 09/20/22 0536 09/20/22 0017 09/19/22  1758   SODIUM 141  --  142  --  140   < > 141   POTASSIUM 3.8 3.7 3.3*   < > 4.0   < > 4.3   CHLORIDE 106  --  107  --  104   < > 103   CO2 25  --  28  --  27   < > 28   CALCIUM 8.4  --  8.1*  --  8.1*   < > 8.5   GLUCOSE 106*  --  111*  --  127*   < > 108*   BUN 14  --  10  --  18   < > 25*   CREATININE 0.92  --  1.00  --  0.98   < > 1.45*   MG  --   --   --   --   --   --  2.2    < > = values in this interval not displayed.        Recent Labs   Lab 09/21/22 0449 09/20/22  0942 09/20/22  0536 09/20/22 0017 09/19/22  1758   HTROPI  --   --   --  1,180* 1,259*   CPK 5,452* 10,744* 11,313* 25,668* 17,775*   NTPROB  --   --   --   --  664*         Radiology: Imaging studies have been reviewed and pertinent findings discussed in the Assessment and Plan.  No results found for any visits on 09/19/22 (from the past 48 hour(s)).     ANCILLARY ORDERS     Diet:  Regular Diet  Telemetry: On  Consults:    IP CONSULT TO ORTHOPEDIC SURGERY  IP CONSULT TO SOCIAL WORK  Therapy Orders:   PT and OT Orders Placed this Encounter   Procedures   • Occupational Therapy   •  Occupational Therapy   • Physical Therapy   • Physical Therapy       Advance Care Planning    ADVANCED DIRECTIVES     Code Status: Full Resuscitation          ASSESSMENT AND PLAN     Right forearm compartment syndrome, status post facility:  Pain management as per Orthopedic surgery, neurovascular status of the right upper extremity to be monitored.  Initially on clindamycin  per Orthopedic surgery, now discontinued.  Underwent closure 09/22/2022.    Severe rhabdomyolysis: Secondary to above, CPK appropriately decreasing, will discontinue IV fluids.      Acute kidney injury, secondary to rhabdomyolysis, resolved.      Elevated AST and ALT:  Related to rhabdomyolysis, appropriately decreasing.      Elevated troponin:  Related to above, 2D echo ordered and normal.    Cocaine and heroin abuse:  Counseling provided.    Hypertension: Does not carry the diagnosis of high blood pressure at home.  Likely exacerbated by pain, fluid overload due to vigorous hydration.  Discontinued IV fluids, continue p.r.n. medications, started scheduled clonidine (could be related to withdrawal), but will place clonidine on hold, giving few hypotensive episodes.    Constipation:  Aggressive bowel regimen started, resolved.      DISCHARGE PLANNING     The patient's treatment plans were discussed with patient.     Recommendations for Discharge   SW Home, Home care   PT Home without therapy needs   OT Home without therapy needs   SLP        Anticipated discharge destination: Home  Expected Discharge Date: TBD  Barriers to Discharge: Patient is not medically ready and needs to remain in the hospital today due to Compartment syndrome    Lainey Jasso MD  Hospitalist  9/22/2022  1:57 PM     : Yes

## 2023-03-05 NOTE — END OF VISIT
[FreeTextEntry3] : I saw the patient with Merari Gleason NP and I agree with the findings and plan as above.  [Time Spent: ___ minutes] : I have spent [unfilled] minutes of time on the encounter.

## 2023-03-05 NOTE — DISCUSSION/SUMMARY
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 \par There is some imaging evidence of cardiac involvement. 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 [EKG obtained to assist in diagnosis and management of assessed problem(s)] : EKG obtained to assist in diagnosis and management of assessed problem(s)

## 2023-03-05 NOTE — HISTORY OF PRESENT ILLNESS
[FreeTextEntry1] : Patient is a 77 year-old with lambda light chains in the urine, a bone marrow biopsy showing 25% plasma cells and Congo Red positive for amyloidosis, also with a positive fat pad biopsy, but no typing obtained, with TTE and pro-BNP concerning for amyloid cardiomyopathy, but no tissue diagnosis of amyloid cardiomyopathy at this time.\par Technetium scan was negative for TTR (interpreted as grade 1).\par \par He has symptoms of neuropathy (orthostatic dizziness, but no numbness or tingling) and lower extremity edema.\par He notes that he fatigues more easily than he used to, but he has no orthopnea or paroxysmal nocturnal dyspnea. \par He reports palpitations that have historically been managed with propranolol.

## 2023-03-05 NOTE — CARDIOLOGY SUMMARY
[de-identified] : 12/6/2022, ventricular pacing\par 2/15/2023, likely AV sequential pacing [de-identified] : 6/28/2021 Holter monitor 24 hours. Normal sinus rhythm. Intermittent paced beats. Premature atrial beats. Occasional sequential up to 6 beats. Few seconds to about 9 minutes of atrial tachycardia. Premature ventricular beats were rare. Though his symptoms corresponded to normal sinus rhythm. 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

## 2023-03-05 NOTE — REASON FOR VISIT
[Other: ____] : [unfilled] [Other: _____] : [unfilled] [FreeTextEntry3] : Rudy Hanna MD [FreeTextEntry1] : \par \par \par \par \par

## 2023-03-05 NOTE — REVIEW OF SYSTEMS
[Negative] : Heme/Lymph [Dizziness] : dizziness [Numbness (Hypoesthesia)] : no numbness [Tingling (Paresthesia)] : no tingling

## 2023-03-15 ENCOUNTER — APPOINTMENT (OUTPATIENT)
Dept: CARDIOLOGY | Facility: CLINIC | Age: 78
End: 2023-03-15
Payer: MEDICARE

## 2023-03-15 VITALS
TEMPERATURE: 97.1 F | DIASTOLIC BLOOD PRESSURE: 80 MMHG | SYSTOLIC BLOOD PRESSURE: 114 MMHG | HEART RATE: 80 BPM | OXYGEN SATURATION: 98 % | HEIGHT: 73 IN | BODY MASS INDEX: 25.18 KG/M2 | WEIGHT: 190 LBS

## 2023-03-15 PROCEDURE — 93280 PM DEVICE PROGR EVAL DUAL: CPT

## 2023-03-21 ENCOUNTER — NON-APPOINTMENT (OUTPATIENT)
Age: 78
End: 2023-03-21

## 2023-05-17 ENCOUNTER — APPOINTMENT (OUTPATIENT)
Dept: ELECTROPHYSIOLOGY | Facility: CLINIC | Age: 78
End: 2023-05-17
Payer: MEDICARE

## 2023-05-17 VITALS
SYSTOLIC BLOOD PRESSURE: 114 MMHG | BODY MASS INDEX: 25.18 KG/M2 | WEIGHT: 190 LBS | HEART RATE: 80 BPM | OXYGEN SATURATION: 96 % | DIASTOLIC BLOOD PRESSURE: 78 MMHG | HEIGHT: 73 IN

## 2023-05-17 DIAGNOSIS — G45.9 TRANSIENT CEREBRAL ISCHEMIC ATTACK, UNSPECIFIED: ICD-10-CM

## 2023-05-17 DIAGNOSIS — Z86.79 PERSONAL HISTORY OF OTHER DISEASES OF THE CIRCULATORY SYSTEM: ICD-10-CM

## 2023-05-17 PROCEDURE — 99214 OFFICE O/P EST MOD 30 MIN: CPT

## 2023-05-17 RX ORDER — METOPROLOL SUCCINATE 25 MG/1
25 TABLET, EXTENDED RELEASE ORAL DAILY
Qty: 90 | Refills: 1 | Status: DISCONTINUED | COMMUNITY
Start: 2021-06-11 | End: 2023-05-17

## 2023-05-22 PROBLEM — Z86.79 H/O SUPRAVENTRICULAR TACHYCARDIA: Status: ACTIVE | Noted: 2017-12-20

## 2023-05-22 PROBLEM — G45.9 TIA (TRANSIENT ISCHEMIC ATTACK): Status: ACTIVE | Noted: 2021-04-01

## 2023-05-22 NOTE — DISCUSSION/SUMMARY
[FreeTextEntry1] : Patient is doing extremely well without any recurrence of arrhythmia.  Unfortunately he was recently diagnosed with amyloid and is undergoing evaluation.  He recently had a cardiac biopsy\par \par His Medtronic dual-chamber pacemaker was interrogated DDDR  function normally.  He is mostly ventricular paced 99% even with long AV delays at 300 ms\par \par His blood pressure is controlled he has no heart failure or ischemic symptoms\par \par Await results of the biopsy.\par \par

## 2023-05-22 NOTE — REVIEW OF SYSTEMS
[Fever] : no fever [Feeling Fatigued] : not feeling fatigued [SOB] : no shortness of breath [Dyspnea on exertion] : dyspnea during exertion [Chest Discomfort] : no chest discomfort [Cough] : no cough [Abdominal Pain] : no abdominal pain [Dizziness] : no dizziness [Easy Bleeding] : no tendency for easy bleeding

## 2023-05-22 NOTE — HISTORY OF PRESENT ILLNESS
[FreeTextEntry1] : Patient is a 77-year-old man who is seen in follow-up evaluation.  He was recently diagnosed with amyloid and underwent biopsies including cardiac.  Results are pending\par \par Medtronic dual chamber PPM: Remaining battery longevity 9.3 years.  DDDR  bpm.  Ventricular paced 99.8%.  Pacing as well as sensing and lead impedances were all within normal range.\par He has had a prior history of a TIA.  \par Patient has had a prior history of hypertension, SVT, status post prior catheter ablation with slow pathway 2017.  He has had a first-degree AV block periods  of type I as well as 2 AV block but previous pauses up to 2.8 seconds.  He status post ILR implant and recent monitoring showed a 3-second pause on March 3 2021.  He underwent Medtronic dual-chamber permanent pacemaker 5/19/2021.\par \par He had an abnormal stress test underwent coronary angiography August 5, 2020 which showed luminal coronary irregularities\par \par Echocardiogram performed on December 2022 that showed ejection fraction 50%\par Echo 2019: EF 55%,  mitral valve prolapse involving the posterior leaflet, mild to moderate mitral regurgitation, normal left atrial size with left atrial volume index 30 cc/m².\par \par \par

## 2023-05-31 ENCOUNTER — NON-APPOINTMENT (OUTPATIENT)
Age: 78
End: 2023-05-31

## 2023-06-14 ENCOUNTER — APPOINTMENT (OUTPATIENT)
Dept: CARDIOLOGY | Facility: CLINIC | Age: 78
End: 2023-06-14

## 2023-08-18 ENCOUNTER — APPOINTMENT (OUTPATIENT)
Dept: CARDIOLOGY | Facility: CLINIC | Age: 78
End: 2023-08-18
Payer: MEDICARE

## 2023-08-18 ENCOUNTER — NON-APPOINTMENT (OUTPATIENT)
Age: 78
End: 2023-08-18

## 2023-08-18 PROCEDURE — 93296 REM INTERROG EVL PM/IDS: CPT

## 2023-08-18 PROCEDURE — 93294 REM INTERROG EVL PM/LDLS PM: CPT

## 2023-08-21 RX ORDER — ASPIRIN 325 MG/1
325 TABLET, FILM COATED ORAL DAILY
Qty: 90 | Refills: 0 | Status: DISCONTINUED | COMMUNITY
Start: 2021-03-29 | End: 2023-08-21

## 2023-09-27 ENCOUNTER — APPOINTMENT (OUTPATIENT)
Dept: CARDIOLOGY | Facility: CLINIC | Age: 78
End: 2023-09-27
Payer: MEDICARE

## 2023-09-27 VITALS
WEIGHT: 197 LBS | HEART RATE: 79 BPM | SYSTOLIC BLOOD PRESSURE: 92 MMHG | DIASTOLIC BLOOD PRESSURE: 60 MMHG | BODY MASS INDEX: 25.99 KG/M2 | OXYGEN SATURATION: 98 %

## 2023-09-27 DIAGNOSIS — I43 HYPERTENSIVE HEART DISEASE W/OUT HEART FAILURE: ICD-10-CM

## 2023-09-27 DIAGNOSIS — I11.9 HYPERTENSIVE HEART DISEASE W/OUT HEART FAILURE: ICD-10-CM

## 2023-09-27 DIAGNOSIS — R42 DIZZINESS AND GIDDINESS: ICD-10-CM

## 2023-09-27 DIAGNOSIS — I95.2 HYPOTENSION DUE TO DRUGS: ICD-10-CM

## 2023-09-27 PROCEDURE — 99214 OFFICE O/P EST MOD 30 MIN: CPT

## 2023-10-25 ENCOUNTER — APPOINTMENT (OUTPATIENT)
Dept: CARDIOLOGY | Facility: CLINIC | Age: 78
End: 2023-10-25
Payer: MEDICARE

## 2023-10-25 VITALS
DIASTOLIC BLOOD PRESSURE: 68 MMHG | SYSTOLIC BLOOD PRESSURE: 102 MMHG | WEIGHT: 195 LBS | HEIGHT: 73 IN | HEART RATE: 88 BPM | BODY MASS INDEX: 25.84 KG/M2 | OXYGEN SATURATION: 97 %

## 2023-10-25 DIAGNOSIS — E85.4 ORGAN-LIMITED AMYLOIDOSIS: ICD-10-CM

## 2023-10-25 DIAGNOSIS — I49.5 SICK SINUS SYNDROME: ICD-10-CM

## 2023-10-25 DIAGNOSIS — I43 ORGAN-LIMITED AMYLOIDOSIS: ICD-10-CM

## 2023-10-25 PROCEDURE — 99214 OFFICE O/P EST MOD 30 MIN: CPT

## 2023-11-17 ENCOUNTER — NON-APPOINTMENT (OUTPATIENT)
Age: 78
End: 2023-11-17

## 2023-11-17 ENCOUNTER — APPOINTMENT (OUTPATIENT)
Dept: CARDIOLOGY | Facility: CLINIC | Age: 78
End: 2023-11-17
Payer: MEDICARE

## 2023-11-17 PROCEDURE — 93294 REM INTERROG EVL PM/LDLS PM: CPT

## 2023-11-17 PROCEDURE — 93296 REM INTERROG EVL PM/IDS: CPT

## 2024-01-22 ENCOUNTER — RX RENEWAL (OUTPATIENT)
Age: 79
End: 2024-01-22

## 2024-01-22 RX ORDER — APIXABAN 5 MG/1
5 TABLET, FILM COATED ORAL
Qty: 180 | Refills: 3 | Status: ACTIVE | COMMUNITY
Start: 2023-08-21 | End: 1900-01-01

## 2024-02-08 NOTE — ASU PATIENT PROFILE, ADULT - NS PRO MODE OF ARRIVAL
"    2/8/2024         RE: Mya Hui  [address]  [place] [facility] [address]        Dear Colleague,    Thank you for referring your patient, Mya Hui, to the [facility] [place]. Please see a copy of my visit note below.    Mya Hui is a 80 year old female who presents for:  Chief Complaint   Patient presents with     Neurologic Problem        Initial Vitals:  /78 (BP Location: Left arm, Cuff Size: Adult Regular)   Pulse 89   Temp 97.8  F (36.6  C) (Temporal)   Ht 5' 2\" (1.575 m)   Wt 209 lb (94.8 kg)   LMP  (LMP Unknown)   SpO2 98%   BMI 38.23 kg/m   Estimated body mass index is 38.23 kg/m  as calculated from the following:    Height as of this encounter: 5' 2\" (1.575 m).    Weight as of this encounter: 209 lb (94.8 kg).. Body surface area is 2.04 meters squared. BP completed using cuff size: regular  Moderate Pain (4)    Nursing Comments:     Suki Hurt, GYPSY      [facility] Neurosurgery  Neurosurgery Follow Up:    HPI: 3 months s/p T11-pelvis robotic decompression and fusion with Dr. Waite on 10/25/2023. Surgery was complicated by hemorrhagic shock. Recently presented to the ED with concerns of acute back pain after rolling over in bed. Imaging revealed L1 compression fracture. Today she was seen for her 3 month follow up. She continues to report midline back pain with pain to the right side. No radicular leg pain or paresthesias. Strength in legs has improved. Up until recent injury was walking well and had returned home. Since has returned to TCU for additional therapy prior to returning home. Incision healing well. Has been wearing brace as recommended. Does not have a bone stimulator.     Medical, surgical, family, and social history unchanged since prior exam.  Exam:  Constitutional:  Alert, well nourished, NAD.  HEENT: Normocephalic, atraumatic.   Pulm:  Without shortness of breath   CV:  No pitting edema of BLE.      Vital " "Signs:  /78 (BP Location: Left arm, Cuff Size: Adult Regular)   Pulse 89   Temp 97.8  F (36.6  C) (Temporal)   Ht 5' 2\" (1.575 m)   Wt 209 lb (94.8 kg)   LMP  (LMP Unknown)   SpO2 98%   BMI 38.23 kg/m      Neurological:  Awake  Alert  Oriented x 3  Motor exam: intact in BLE except 4/5 bilateral hip flexor and knee extension.     Able to spontaneously move L/E bilaterally  Sensation intact throughout all L/E dermatomes     Incisions:  Healing nicely.  Imaging:   EXAM: CT LUMBAR SPINE W/O and W CONTRAST  LOCATION: [facility]  DATE: 2/4/2024     INDICATION: Pain post op surgery  COMPARISON:  CT abdomen pelvis 10/31/2023, lumbar spine CT 10/16/2023, lumbar radiographs 11/08/2023.  CONTRAST: Isovue 370 75 mL  TECHNIQUE: Routine CT Lumbar Spine with IV contrast. Multiplanar reformats. Dose reduction techniques were used.     FINDINGS:  VERTEBRA: Diffuse demineralization. Postoperative changes from T11-S1 PLIF with right L3-L4 and L4-L5 TLIFs and bilateral sacroiliac anchors. Multilevel laminectomies. No acute hardware fracture. Interval loosening of the bilateral T11 and right T12   pedicle screws. New L1 superior endplate compression fracture with less than 25% vertebral body height loss. Scoliosis. Minimal retrolisthesis of T12 on L1 and minimal anterolisthesis of L1 on L2. Minimal anterolisthesis of L4 on L5.     CANAL/FORAMINA: Not well assessed due to beam hardening from the fusion construct.     PARASPINAL: Postoperative changes of the dorsal paraspinal soft tissues with possible small superficial fluid collection on the right measuring 1.8 x 2.0 cm at L4-L5 level (series 9, image 116).                                                                      IMPRESSION:  1.  Multilevel postoperative changes of the lumbar spine from T11-S1 with interval loosening of the bilateral T11 and right T12 pedicle screws since 10/16/2023.  2.  New L1 superior endplate compression " fracture with less than 25% vertebral body height loss since 10/16/2023.  3.  Sterility indeterminate 2 cm superficial fluid collection in the superficial postoperative bed on the right at L4-L5.    A/P: s/p thoracolumbar fusion, hemorrhagic shock  Continue to increase activities as tolerated. Will continue to wear brace as previously recommended. Will obtain bone stimulator. Follow up in 6 weeks with xray prior. She verbalized understanding and agreement.   Patient Instructions   -Continue to wear brace as previously recommended.   -Bone stimulator order placed. They will contact you to get scheduled.   -Follow up in 6 weeks with xray prior.  -Please contact our clinic with questions or concerns at [phone].    Rhina Gregory CNP  [facility] Neurosurgery  [address] [place] [id number]  Tel [phone]  Fax [phone]      Orthofix bone growth stimulator ordered. Emailed order, demographics (facesheet), Neurosurgery snapshot (medical hx etc.), progress notes from current to initial visit prior to surgery, op note to [employer] rep to [email or web address] on 02/08/24.    Wrote on facesheet that patient [facility].     Mansi Quinonez RN on 2/8/2024 at 2:50 PM      Again, thank you for allowing me to participate in the care of your patient.        Sincerely,        Rhina Gregory NP   ambulatory

## 2024-02-16 ENCOUNTER — NON-APPOINTMENT (OUTPATIENT)
Age: 79
End: 2024-02-16

## 2024-02-16 ENCOUNTER — APPOINTMENT (OUTPATIENT)
Dept: CARDIOLOGY | Facility: CLINIC | Age: 79
End: 2024-02-16
Payer: MEDICARE

## 2024-02-16 PROCEDURE — 93294 REM INTERROG EVL PM/LDLS PM: CPT

## 2024-02-16 PROCEDURE — 93296 REM INTERROG EVL PM/IDS: CPT

## 2024-03-30 LAB — HBA1C MFR BLD HPLC: 5.9

## 2024-04-02 ENCOUNTER — APPOINTMENT (OUTPATIENT)
Dept: CARDIOLOGY | Facility: CLINIC | Age: 79
End: 2024-04-02
Payer: MEDICARE

## 2024-04-02 VITALS
HEIGHT: 73 IN | SYSTOLIC BLOOD PRESSURE: 100 MMHG | HEART RATE: 81 BPM | BODY MASS INDEX: 25.31 KG/M2 | WEIGHT: 191 LBS | OXYGEN SATURATION: 95 % | DIASTOLIC BLOOD PRESSURE: 60 MMHG

## 2024-04-02 DIAGNOSIS — I47.29 OTHER VENTRICULAR TACHYCARDIA: ICD-10-CM

## 2024-04-02 PROCEDURE — 93280 PM DEVICE PROGR EVAL DUAL: CPT

## 2024-04-02 RX ORDER — ASPIRIN ENTERIC COATED TABLETS 81 MG 81 MG/1
81 TABLET, DELAYED RELEASE ORAL
Qty: 90 | Refills: 3 | Status: DISCONTINUED | COMMUNITY
Start: 2023-08-21 | End: 2024-04-02

## 2024-04-02 RX ORDER — TELMISARTAN AND HYDROCHLOROTHIAZIDE 80; 12.5 MG/1; MG/1
80-12.5 TABLET ORAL DAILY
Qty: 45 | Refills: 3 | Status: DISCONTINUED | COMMUNITY
Start: 2018-07-27 | End: 2024-04-02

## 2024-04-02 NOTE — PROCEDURE
[Pacemaker] : pacemaker [DDDR] : DDDR [Voltage: ___ volts] : Voltage was [unfilled] volts [Threshold Testing Performed] : Threshold testing was performed [Lead Imp:  ___ohms] : lead impedance was [unfilled] ohms [Sensing Amplitude ___mv] : sensing amplitude was [unfilled] mv [___V @] : [unfilled] V [___ ms] : [unfilled] ms [de-identified] : Medtronic [de-identified] : Jeana ARGUELLO DR MRI [de-identified] : [id number] [de-identified] : 5/19/2021 [de-identified] :  [de-identified] : 8.5 years  [de-identified] : Apaced: 82.2% Vpaced:  98.2%  Settings  Atrial  Sensing 0.3mv Amplitude 3.5 Duration 0.4ms  Ventricle Sensing 0.9mv Amplitude 3.5v Duration 0.4ms  10 episodes of NSVT in the last year.  Most recent was 2/19/24 at 0020 for 2 seconds at 183 BPM.  Not on BB.  Low BP with occasional symptoms of orthostatic hypotension.  He does not know his meds.   Repeat echocardiogram and labwork.  He is in a study for light chain gammopathy with routine bloodwork, but is not aware of the results.   If room to adjust medications to that we can add BB, we will do so after he calls with his medications.     Device outputs were adjusted to reflect threshold testing 2 time safety margin.  F/U after echo and labs to review.

## 2024-04-30 ENCOUNTER — APPOINTMENT (OUTPATIENT)
Dept: CARDIOLOGY | Facility: CLINIC | Age: 79
End: 2024-04-30
Payer: MEDICARE

## 2024-04-30 VITALS
OXYGEN SATURATION: 99 % | DIASTOLIC BLOOD PRESSURE: 80 MMHG | BODY MASS INDEX: 25.98 KG/M2 | SYSTOLIC BLOOD PRESSURE: 118 MMHG | WEIGHT: 196 LBS | HEART RATE: 87 BPM | HEIGHT: 73 IN

## 2024-04-30 DIAGNOSIS — I77.810 THORACIC AORTIC ECTASIA: ICD-10-CM

## 2024-04-30 DIAGNOSIS — I10 ESSENTIAL (PRIMARY) HYPERTENSION: ICD-10-CM

## 2024-04-30 DIAGNOSIS — Z95.0 PRESENCE OF CARDIAC PACEMAKER: ICD-10-CM

## 2024-04-30 DIAGNOSIS — E78.2 MIXED HYPERLIPIDEMIA: ICD-10-CM

## 2024-04-30 DIAGNOSIS — Z79.01 LONG TERM (CURRENT) USE OF ANTICOAGULANTS: ICD-10-CM

## 2024-04-30 DIAGNOSIS — I48.0 PAROXYSMAL ATRIAL FIBRILLATION: ICD-10-CM

## 2024-04-30 DIAGNOSIS — I25.10 ATHEROSCLEROTIC HEART DISEASE OF NATIVE CORONARY ARTERY W/OUT ANGINA PECTORIS: ICD-10-CM

## 2024-04-30 PROCEDURE — 99214 OFFICE O/P EST MOD 30 MIN: CPT

## 2024-04-30 PROCEDURE — 76377 3D RENDER W/INTRP POSTPROCES: CPT

## 2024-04-30 PROCEDURE — 93306 TTE W/DOPPLER COMPLETE: CPT

## 2024-04-30 PROCEDURE — G2211 COMPLEX E/M VISIT ADD ON: CPT

## 2024-04-30 PROCEDURE — 93356 MYOCRD STRAIN IMG SPCKL TRCK: CPT

## 2024-04-30 RX ORDER — HYDROCORTISONE VALERATE 2 MG/G
0.2 CREAM TOPICAL
Qty: 15 | Refills: 0 | Status: DISCONTINUED | COMMUNITY
Start: 2021-05-23 | End: 2024-04-30

## 2024-04-30 NOTE — PHYSICAL EXAM
[Normal S1, S2] : normal S1, S2 [No Rub] : no rub [No Gallop] : no gallop [Normal] : no edema, no cyanosis, no clubbing, no varicosities [Moves all extremities] : moves all extremities [Alert and Oriented] : alert and oriented [Normal Speech] : normal speech [de-identified] : Orthostatic hypotension

## 2024-04-30 NOTE — CARDIOLOGY SUMMARY
[LVEF ___%] : LVEF [unfilled]% [Mild] : mild mitral regurgitation [___] : [unfilled] [de-identified] : December 6, 2022.  Ventricular paced rhythm [de-identified] : June 28, 2021.  Holter monitor 24 hours.  Normal sinus rhythm.  Intermittent paced beats.  Premature atrial beats.  Occasional sequential up to 6 beats.  Few seconds to about 9 minutes of atrial tachycardia.  Premature ventricular beats were rare.  Though his symptoms corresponded to normal sinus rhythm.\par  \par  Blood pressure readings at home are consistently overall less than 140/90.  Closer to less than 130/80 in majority of the occasions. [de-identified] : March 3, 2023.  Echocardiogram.  Post artery biopsy. December 6, 2023.  LVEF 50 to 55% mild to moderate concentric LVH.  Abnormal global strain. Echocardiogram April 30, 2024.  LVEF50 to 55%.  Mild to moderate LVH.  Abnormal global longitudinal strain.  No significant new changes. [de-identified] : PYP scan December 2022 no evidence of amyloidosis. [de-identified] : Device check recently showed atrial fibrillation. [de-identified] : Cardiac biopsy pathology.  Hypertrophy and positive for amyloidosis.

## 2024-04-30 NOTE — REASON FOR VISIT
[Symptom and Test Evaluation] : symptom and test evaluation [Arrhythmia/ECG Abnorrmalities] : arrhythmia/ECG abnormalities [Hypertension] : hypertension [FreeTextEntry3] : Dr. Oppenheimer

## 2024-04-30 NOTE — DISCUSSION/SUMMARY
[FreeTextEntry1] : NAEEM RUGGIERO  is a 78 year M  who presents today  with the above history and the following active issues.    Paroxysmal atrial fibrillation.  High CHADS2 2 VASC 2 score.  Normal TSH.  Cardiac amyloidosis.  Relatively asymptomatic.  On anticoagulation.  Tolerating it well.  Watch for bleeding complication.  Mitral valve prolapse, mild to moderate mitral regurgitation. Normal pulmonary pressure. Borderline RV dimensions. Stable thoracic aortic dimensions.  Essential hypertension. Hypertensive heart disease.  Low mean blood pressure today.  Orthostatic postural dizziness.Continue to follow blood pressure without any antihypertensive therapy at present.  Hyperlipidemia  tolerating livallo well. Continue lifestyle modification and follow  labs on a regular basis.  Paroxysmal supraventricular tachycardia. Status post ablation of AVNRT. No symptomatic recurrence.  Paroxysmal atrial tachycardia.  Short episodes.  On metoprolol.  If more symptoms increase dose.  Thoracic aortic ectasia. Stable. No significant worsening. Continue blood pressure, heart rate control.  He understands high risk symptoms to call 911. He works as volunteer ambulance staff. Avoid aggressive isometric exercises  Cardiac / systemic amyloidosis   On the study protocol.  Continue to follow with study group.  Red flag symptoms which would warrant sooner emergent evaluation reviewed with the patient.  Questions and concerns were addressed and answered.  Counseling regarding low saturated fat,salt and carbohydrate intake was reviewed. Active lifestyle and regular exercise  along with weight management is advised. I have reviewed above at length. I answered all the questions. Patient verbalized understandings. Thank you very much for allowing me to participate in your patient's care. Please feel free to call me for any questions. Sincerely,  Karthikeyan Marino MD, [facility], [facility]

## 2024-04-30 NOTE — ASSESSMENT
[FreeTextEntry1] : Reviewed onApril 29, 2024.  Echocardiogram reviewed.  Most recent CBC and hemoglobin A1c were reviewed which were stable on 4/29/2024.

## 2024-04-30 NOTE — HISTORY OF PRESENT ILLNESS
[FreeTextEntry1] : NAEEM RUGGIERO  is a 78 year M  who presents today For clinical follow-up.  Overall he has been feeling well. There has been no recent illness or hospital stay. Medications have remained unchanged. Asymptomatic from cardiovascular and arrhythmia standpoint. In a study protocol for light chain amyloidosis at [facility] [place].  Today he denies chest pain, pressure, unusual shortness of breath, lightheadedness, dizziness, near syncope or syncope.   Other active medical problems as noted - Afib on AC with no complaints of bleeding - Essential hypertension. Hypertensive heart disease. On carvedilol, Amlodipine, and Valsartan-Hydrochlorothiazide.  - dyslipidemia mixed : Tolerating livalo.   - Coronary artery disease, nonobstructive on a CTA, per past note. Preserved LV systolic function.  Stable perfusion scan, per past note.  On antiplatelet agent, statin therapy and beta blockers.  - thoracic aortic ectasia. Stable, remaining asymptomatic.   - moderate mitral/aortic non rheumatic  regurgitation. Stable LV dimension, ejection fraction, no history or signs of left or right heart failure per past note.   - s/p EPS and Carto SVT RF ablation (AVNRT) on 2/16/17.  Sinus bradycardia s/p ILR status post permanent pacemaker implantation May 17, 2021

## 2024-04-30 NOTE — REVIEW OF SYSTEMS
[Palpitations] : palpitations [Cough] : cough [Dizziness] : dizziness [Negative] : Heme/Lymph [SOB] : no shortness of breath [Dyspnea on exertion] : not dyspnea during exertion [Chest Discomfort] : no chest discomfort [Lower Ext Edema] : no extremity edema [Leg Claudication] : no intermittent leg claudication [Orthopnea] : no orthopnea [PND] : no PND [Syncope] : no syncope [Wheezing] : no wheezing [Coughing Up Blood] : no hemoptysis [Snoring] : no snoring [Tremor] : no tremor was seen [Numbness (Hypoesthesia)] : no numbness [Convulsions] : no convulsions [Tingling (Paresthesia)] : no tingling [Weakness] : no weakness [Limb Weakness (Paresis)] : no limb weakness (Paresis) [Speech Disturbance] : no speech disturbance [FreeTextEntry5] : As noted in HPI

## 2024-05-02 LAB — HBA1C MFR BLD HPLC: 4.6

## 2024-06-19 ENCOUNTER — APPOINTMENT (OUTPATIENT)
Dept: CARDIOLOGY | Facility: CLINIC | Age: 79
End: 2024-06-19

## 2024-07-05 ENCOUNTER — NON-APPOINTMENT (OUTPATIENT)
Age: 79
End: 2024-07-05

## 2024-07-05 ENCOUNTER — APPOINTMENT (OUTPATIENT)
Dept: CARDIOLOGY | Facility: CLINIC | Age: 79
End: 2024-07-05
Payer: MEDICARE

## 2024-07-05 PROCEDURE — 93294 REM INTERROG EVL PM/LDLS PM: CPT

## 2024-07-05 PROCEDURE — 93296 REM INTERROG EVL PM/IDS: CPT

## 2024-10-07 ENCOUNTER — APPOINTMENT (OUTPATIENT)
Dept: CARDIOLOGY | Facility: CLINIC | Age: 79
End: 2024-10-07
Payer: MEDICARE

## 2024-10-07 ENCOUNTER — NON-APPOINTMENT (OUTPATIENT)
Age: 79
End: 2024-10-07

## 2024-10-07 PROCEDURE — 93294 REM INTERROG EVL PM/LDLS PM: CPT

## 2024-10-07 PROCEDURE — 93296 REM INTERROG EVL PM/IDS: CPT

## 2024-11-12 ENCOUNTER — APPOINTMENT (OUTPATIENT)
Dept: CARDIOLOGY | Facility: CLINIC | Age: 79
End: 2024-11-12
Payer: MEDICARE

## 2024-11-12 VITALS
BODY MASS INDEX: 25.71 KG/M2 | DIASTOLIC BLOOD PRESSURE: 80 MMHG | HEIGHT: 73 IN | WEIGHT: 194 LBS | HEART RATE: 80 BPM | OXYGEN SATURATION: 98 % | SYSTOLIC BLOOD PRESSURE: 108 MMHG

## 2024-11-12 DIAGNOSIS — I10 ESSENTIAL (PRIMARY) HYPERTENSION: ICD-10-CM

## 2024-11-12 DIAGNOSIS — I48.0 PAROXYSMAL ATRIAL FIBRILLATION: ICD-10-CM

## 2024-11-12 DIAGNOSIS — E78.2 MIXED HYPERLIPIDEMIA: ICD-10-CM

## 2024-11-12 DIAGNOSIS — Z79.01 LONG TERM (CURRENT) USE OF ANTICOAGULANTS: ICD-10-CM

## 2024-11-12 PROCEDURE — 99214 OFFICE O/P EST MOD 30 MIN: CPT

## 2024-11-12 RX ORDER — UBIDECARENONE 30 MG
CAPSULE ORAL
Refills: 0 | Status: ACTIVE | COMMUNITY

## 2024-11-12 RX ORDER — EZETIMIBE 10 MG/1
10 TABLET ORAL DAILY
Refills: 0 | Status: ACTIVE | COMMUNITY

## 2024-11-12 RX ORDER — DOCUSATE SODIUM 50 MG
50 CAPSULE ORAL DAILY
Refills: 0 | Status: ACTIVE | COMMUNITY

## 2024-11-12 RX ORDER — FUROSEMIDE 20 MG/1
20 TABLET ORAL
Refills: 0 | Status: ACTIVE | COMMUNITY

## 2024-11-12 RX ORDER — ASPIRIN ENTERIC COATED TABLETS 81 MG 81 MG/1
81 TABLET, DELAYED RELEASE ORAL DAILY
Refills: 0 | Status: ACTIVE | COMMUNITY

## 2024-11-12 RX ORDER — BUPROPION HYDROCHLORIDE 150 MG/1
150 TABLET, FILM COATED ORAL
Refills: 0 | Status: ACTIVE | COMMUNITY

## 2024-11-12 RX ORDER — ONDANSETRON 8 MG/1
8 TABLET, ORALLY DISINTEGRATING ORAL
Refills: 0 | Status: ACTIVE | COMMUNITY

## 2024-11-12 RX ORDER — PROPRANOLOL HCL 80 MG
80 TABLET ORAL DAILY
Refills: 0 | Status: ACTIVE | COMMUNITY

## 2024-11-12 RX ORDER — DAPAGLIFLOZIN 10 MG/1
10 TABLET, FILM COATED ORAL DAILY
Refills: 0 | Status: ACTIVE | COMMUNITY

## 2024-11-12 RX ORDER — ACYCLOVIR 400 MG/1
400 TABLET ORAL TWICE DAILY
Refills: 0 | Status: ACTIVE | COMMUNITY

## 2024-11-12 RX ORDER — PANTOPRAZOLE 40 MG/1
40 TABLET, DELAYED RELEASE ORAL DAILY
Refills: 0 | Status: ACTIVE | COMMUNITY

## 2024-11-12 RX ORDER — SERTRALINE HYDROCHLORIDE 100 MG/1
100 TABLET, FILM COATED ORAL DAILY
Refills: 0 | Status: ACTIVE | COMMUNITY

## 2025-01-06 ENCOUNTER — RX RENEWAL (OUTPATIENT)
Age: 80
End: 2025-01-06

## 2025-01-06 ENCOUNTER — APPOINTMENT (OUTPATIENT)
Dept: CARDIOLOGY | Facility: CLINIC | Age: 80
End: 2025-01-06
Payer: MEDICARE

## 2025-01-06 ENCOUNTER — NON-APPOINTMENT (OUTPATIENT)
Age: 80
End: 2025-01-06

## 2025-01-06 PROCEDURE — 93296 REM INTERROG EVL PM/IDS: CPT

## 2025-01-06 PROCEDURE — 93294 REM INTERROG EVL PM/LDLS PM: CPT

## 2025-04-07 ENCOUNTER — APPOINTMENT (OUTPATIENT)
Dept: CARDIOLOGY | Facility: CLINIC | Age: 80
End: 2025-04-07

## 2025-04-09 ENCOUNTER — APPOINTMENT (OUTPATIENT)
Dept: CARDIOLOGY | Facility: CLINIC | Age: 80
End: 2025-04-09

## 2025-04-09 PROCEDURE — 93294 REM INTERROG EVL PM/LDLS PM: CPT

## 2025-04-09 PROCEDURE — 93296 REM INTERROG EVL PM/IDS: CPT

## 2025-04-16 ENCOUNTER — OFFICE (OUTPATIENT)
Dept: URBAN - METROPOLITAN AREA CLINIC 8 | Facility: CLINIC | Age: 80
Setting detail: OPHTHALMOLOGY
End: 2025-04-16
Payer: MEDICARE

## 2025-04-16 DIAGNOSIS — H16.223: ICD-10-CM

## 2025-04-16 DIAGNOSIS — H52.4: ICD-10-CM

## 2025-04-16 DIAGNOSIS — Z96.1: ICD-10-CM

## 2025-04-16 PROCEDURE — 92014 COMPRE OPH EXAM EST PT 1/>: CPT | Performed by: OPHTHALMOLOGY

## 2025-04-16 PROCEDURE — 92015 DETERMINE REFRACTIVE STATE: CPT | Performed by: OPHTHALMOLOGY

## 2025-04-16 ASSESSMENT — REFRACTION_CURRENTRX
OS_VPRISM_DIRECTION: SV
OS_VPRISM_DIRECTION: SV
OS_CYLINDER: +0.50
OS_OVR_VA: 20/
OD_AXIS: 013
OD_SPHERE: -1.25
OS_SPHERE: -3.25
OD_VPRISM_DIRECTION: SV
OS_SPHERE: -2.50
OD_SPHERE: -3.25
OD_OVR_VA: 20/
OD_VPRISM_DIRECTION: SV
OS_OVR_VA: 20/
OS_CYLINDER: -1.00
OD_AXIS: 108
OS_AXIS: 081
OS_AXIS: 179
OD_CYLINDER: +2.25
OD_OVR_VA: 20/
OD_CYLINDER: -2.00

## 2025-04-16 ASSESSMENT — REFRACTION_AUTOREFRACTION
OD_SPHERE: -1.50
OD_CYLINDER: -2.00
OS_AXIS: 086
OS_SPHERE: -3.25
OS_CYLINDER: -1.25
OD_AXIS: 108

## 2025-04-16 ASSESSMENT — KERATOMETRY
OD_AXISANGLE_DEGREES: 027
METHOD_AUTO_MANUAL: AUTO
OS_K1POWER_DIOPTERS: 42.75
OS_K2POWER_DIOPTERS: 42.75
OS_AXISANGLE_DEGREES: 090
OD_K1POWER_DIOPTERS: 41.50
OD_K2POWER_DIOPTERS: 42.25

## 2025-04-16 ASSESSMENT — REFRACTION_MANIFEST
OD_ADD: +2.75
OS_VA2: 20/20(J1+)
OD_CYLINDER: -2.00
OD_VA2: 20/20(J1+)
OD_AXIS: 110
OS_AXIS: 085
OU_VA: 20/20-2
OD_VA1: 20/20-2
OS_VA1: 20/30-2
OD_SPHERE: -1.50
OU_VA: 20/20-2
OS_VA2: 20/20(J1+)
OS_ADD: +2.75
OS_AXIS: 085
OS_VA1: 20/30-2
OS_CYLINDER: -1.25
OD_VA1: 20/20-2
OD_CYLINDER: -2.00
OS_ADD: +2.75
OS_SPHERE: -3.00
OD_ADD: +2.75
OS_CYLINDER: -1.25
OD_AXIS: 110
OD_VA2: 20/20(J1+)
OS_SPHERE: -3.00
OD_SPHERE: -1.50

## 2025-04-16 ASSESSMENT — TONOMETRY
OS_IOP_MMHG: 13
OD_IOP_MMHG: 13

## 2025-04-16 ASSESSMENT — CONFRONTATIONAL VISUAL FIELD TEST (CVF)
OD_FINDINGS: FULL
OS_FINDINGS: FULL

## 2025-04-16 ASSESSMENT — VISUAL ACUITY
OS_BCVA: 20/25-2
OD_BCVA: 20/40

## 2025-05-08 ENCOUNTER — APPOINTMENT (OUTPATIENT)
Dept: CARDIOLOGY | Facility: CLINIC | Age: 80
End: 2025-05-08

## 2025-05-13 ENCOUNTER — APPOINTMENT (OUTPATIENT)
Dept: CARDIOLOGY | Facility: CLINIC | Age: 80
End: 2025-05-13
Payer: MEDICARE

## 2025-05-13 ENCOUNTER — NON-APPOINTMENT (OUTPATIENT)
Age: 80
End: 2025-05-13

## 2025-05-13 VITALS
HEIGHT: 73 IN | BODY MASS INDEX: 25.31 KG/M2 | HEART RATE: 86 BPM | DIASTOLIC BLOOD PRESSURE: 64 MMHG | SYSTOLIC BLOOD PRESSURE: 110 MMHG | WEIGHT: 191 LBS | OXYGEN SATURATION: 100 %

## 2025-05-13 DIAGNOSIS — I11.9 HYPERTENSIVE HEART DISEASE W/OUT HEART FAILURE: ICD-10-CM

## 2025-05-13 DIAGNOSIS — I25.10 ATHEROSCLEROTIC HEART DISEASE OF NATIVE CORONARY ARTERY W/OUT ANGINA PECTORIS: ICD-10-CM

## 2025-05-13 DIAGNOSIS — I43 ORGAN-LIMITED AMYLOIDOSIS: ICD-10-CM

## 2025-05-13 DIAGNOSIS — E85.4 ORGAN-LIMITED AMYLOIDOSIS: ICD-10-CM

## 2025-05-13 DIAGNOSIS — I48.0 PAROXYSMAL ATRIAL FIBRILLATION: ICD-10-CM

## 2025-05-13 DIAGNOSIS — I43 HYPERTENSIVE HEART DISEASE W/OUT HEART FAILURE: ICD-10-CM

## 2025-05-13 DIAGNOSIS — E78.2 MIXED HYPERLIPIDEMIA: ICD-10-CM

## 2025-05-13 DIAGNOSIS — I77.810 THORACIC AORTIC ECTASIA: ICD-10-CM

## 2025-05-13 PROCEDURE — G2211 COMPLEX E/M VISIT ADD ON: CPT

## 2025-05-13 PROCEDURE — 99214 OFFICE O/P EST MOD 30 MIN: CPT

## 2025-05-13 PROCEDURE — 93000 ELECTROCARDIOGRAM COMPLETE: CPT

## 2025-05-13 RX ORDER — METOPROLOL SUCCINATE 25 MG/1
25 TABLET, EXTENDED RELEASE ORAL DAILY
Qty: 90 | Refills: 3 | Status: ACTIVE | COMMUNITY
Start: 2025-05-13

## 2025-05-13 RX ORDER — SPIRONOLACTONE 25 MG/1
25 TABLET ORAL DAILY
Qty: 30 | Refills: 1 | Status: ACTIVE | COMMUNITY
Start: 2025-05-13

## 2025-07-09 ENCOUNTER — APPOINTMENT (OUTPATIENT)
Dept: CARDIOLOGY | Facility: CLINIC | Age: 80
End: 2025-07-09
Payer: MEDICARE

## 2025-07-09 ENCOUNTER — NON-APPOINTMENT (OUTPATIENT)
Age: 80
End: 2025-07-09

## 2025-07-09 PROCEDURE — 93296 REM INTERROG EVL PM/IDS: CPT

## 2025-07-09 PROCEDURE — 93294 REM INTERROG EVL PM/LDLS PM: CPT

## 2025-08-19 ENCOUNTER — APPOINTMENT (OUTPATIENT)
Dept: CARDIOLOGY | Facility: CLINIC | Age: 80
End: 2025-08-19
Payer: MEDICARE

## 2025-08-19 VITALS
SYSTOLIC BLOOD PRESSURE: 112 MMHG | WEIGHT: 178 LBS | DIASTOLIC BLOOD PRESSURE: 68 MMHG | HEART RATE: 87 BPM | BODY MASS INDEX: 23.48 KG/M2 | OXYGEN SATURATION: 96 %

## 2025-08-19 VITALS — SYSTOLIC BLOOD PRESSURE: 90 MMHG | DIASTOLIC BLOOD PRESSURE: 60 MMHG

## 2025-08-19 DIAGNOSIS — I25.10 ATHEROSCLEROTIC HEART DISEASE OF NATIVE CORONARY ARTERY W/OUT ANGINA PECTORIS: ICD-10-CM

## 2025-08-19 DIAGNOSIS — R42 DIZZINESS AND GIDDINESS: ICD-10-CM

## 2025-08-19 DIAGNOSIS — E78.2 MIXED HYPERLIPIDEMIA: ICD-10-CM

## 2025-08-19 DIAGNOSIS — Z95.0 PRESENCE OF CARDIAC PACEMAKER: ICD-10-CM

## 2025-08-19 DIAGNOSIS — I48.0 PAROXYSMAL ATRIAL FIBRILLATION: ICD-10-CM

## 2025-08-19 DIAGNOSIS — Z79.01 LONG TERM (CURRENT) USE OF ANTICOAGULANTS: ICD-10-CM

## 2025-08-19 DIAGNOSIS — I43 ORGAN-LIMITED AMYLOIDOSIS: ICD-10-CM

## 2025-08-19 DIAGNOSIS — E85.4 ORGAN-LIMITED AMYLOIDOSIS: ICD-10-CM

## 2025-08-19 DIAGNOSIS — I10 ESSENTIAL (PRIMARY) HYPERTENSION: ICD-10-CM

## 2025-08-19 PROCEDURE — 99214 OFFICE O/P EST MOD 30 MIN: CPT

## 2025-08-19 PROCEDURE — G2211 COMPLEX E/M VISIT ADD ON: CPT
